# Patient Record
Sex: MALE | Race: WHITE | NOT HISPANIC OR LATINO | Employment: OTHER | URBAN - METROPOLITAN AREA
[De-identification: names, ages, dates, MRNs, and addresses within clinical notes are randomized per-mention and may not be internally consistent; named-entity substitution may affect disease eponyms.]

---

## 2017-01-18 ENCOUNTER — OFFICE VISIT (OUTPATIENT)
Dept: GASTROENTEROLOGY | Facility: CLINIC | Age: 57
End: 2017-01-18

## 2017-01-18 VITALS — WEIGHT: 212.2 LBS | BODY MASS INDEX: 26.38 KG/M2 | HEIGHT: 75 IN

## 2017-01-18 DIAGNOSIS — R11.0 NAUSEA: Primary | ICD-10-CM

## 2017-01-18 DIAGNOSIS — R10.32 LEFT LOWER QUADRANT PAIN: ICD-10-CM

## 2017-01-18 DIAGNOSIS — R19.7 DIARRHEA, UNSPECIFIED TYPE: ICD-10-CM

## 2017-01-18 DIAGNOSIS — R19.4 ALTERED BOWEL HABITS: ICD-10-CM

## 2017-01-18 PROCEDURE — 99214 OFFICE O/P EST MOD 30 MIN: CPT | Performed by: INTERNAL MEDICINE

## 2017-01-18 RX ORDER — SUCRALFATE 1 G/1
1 TABLET ORAL 4 TIMES DAILY PRN
Qty: 60 TABLET | Refills: 2 | Status: SHIPPED | OUTPATIENT
Start: 2017-01-18 | End: 2017-01-18 | Stop reason: SDUPTHER

## 2017-01-18 RX ORDER — PANTOPRAZOLE SODIUM 20 MG/1
TABLET, DELAYED RELEASE ORAL
Refills: 1 | COMMUNITY
Start: 2016-10-28 | End: 2017-01-18

## 2017-01-18 RX ORDER — SUCRALFATE 1 G/1
1 TABLET ORAL 4 TIMES DAILY PRN
Qty: 60 TABLET | Refills: 2 | Status: SHIPPED | OUTPATIENT
Start: 2017-01-18 | End: 2019-07-09

## 2017-01-18 NOTE — PROGRESS NOTES
Chief Complaint   Patient presents with   • Follow-up     c/s       Carson Rosales is a  56 y.o. male here for a follow up visit for Chronic nausea and abdominal discomfort as well as altered bowel habits.  Recently met Mr. Rosales drain encounter for an outpatient EGD and colonoscopy.  He described 25 years of intermittent nausea.  Nothing has really made this better or worse.  He never has acid reflux.  He does not take NSAIDs.  He does not vomit.  This often leads some filling worn out and fatigue.  He also has a lot of irregularity to his bowel movements.  He has abdominal discomfort intermittently.  He has taken something for this in the past but was told that it was habit-forming and he discontinued it although it did help.  Nothing else has really helped him.  He does have a personal history of colon polyps.  He was found to have a peptic duodenitis during his recent EGD and I started him on pantoprazole twice daily at a low dose.  He states this has made his diarrhea much worse.  He is having a lot of bowel noise and cramping.  His stools are consistently loose.  He does not feel any better on this medication.  He has not seen any blood in his stool.  His weight has been stable.  He had 2 small tubular adenomas removed from his descending colon during his most recent colonoscopy..   HPI  Past Medical History   Diagnosis Date   • Colon polyps      hx     Past Surgical History   Procedure Laterality Date   • Mandible fracture surgery         Current Outpatient Prescriptions:   •  hyoscyamine (LEVSIN) 0.125 MG SL tablet, Take 1 tablet by mouth Every 6 (Six) Hours As Needed for cramping., Disp: 60 tablet, Rfl: 3  •  sucralfate (CARAFATE) 1 G tablet, Take 1 tablet by mouth 4 (Four) Times a Day As Needed (nausea)., Disp: 60 tablet, Rfl: 2  PRN Meds:.  No Known Allergies  Social History     Social History   • Marital status:      Spouse name: N/A   • Number of children: N/A   • Years of education: N/A      Occupational History   • Not on file.     Social History Main Topics   • Smoking status: Current Every Day Smoker   • Smokeless tobacco: Not on file   • Alcohol use Yes      Comment: rarely   • Drug use: Not on file   • Sexual activity: Not on file     Other Topics Concern   • Not on file     Social History Narrative   • No narrative on file     History reviewed. No pertinent family history.  Review of Systems   Constitutional: Positive for appetite change and fatigue. Negative for fever and unexpected weight change.   Gastrointestinal: Positive for nausea. Negative for vomiting.     There were no vitals filed for this visit.  Last Weight    01/18/17  1457   Weight: 212 lb 3.2 oz (96.3 kg)     Physical Exam   Constitutional: He is oriented to person, place, and time. He appears well-developed and well-nourished.   HENT:   Head: Normocephalic and atraumatic.   Eyes: Conjunctivae are normal. No scleral icterus.   Neck: Normal range of motion. Neck supple.   Cardiovascular: Normal rate and regular rhythm.    Pulmonary/Chest: Effort normal and breath sounds normal.   Abdominal: Soft. Bowel sounds are normal. He exhibits no distension. There is tenderness.       Musculoskeletal: He exhibits no edema.   Neurological: He is alert and oriented to person, place, and time.   Skin: Skin is warm and dry.   Psychiatric: He has a normal mood and affect.   Nursing note and vitals reviewed.    No images are attached to the encounter.  Diagnoses and all orders for this visit:    Nausea  -     CBC & Differential  -     Comprehensive Metabolic Panel  -     Celiac Comprehensive Panel  -     TSH  -     Lipase  -     Amylase  -     US Gallbladder; Future    Altered bowel habits    Left lower quadrant pain    Diarrhea, unspecified type    Other orders  -     Discontinue: pantoprazole (PROTONIX) 20 MG EC tablet; TK 1 T PO BID  -     Discontinue: hyoscyamine (LEVSIN) 0.125 MG SL tablet; Take 1 tablet by mouth Every 6 (Six) Hours As  Needed for cramping.  -     Discontinue: sucralfate (CARAFATE) 1 G tablet; Take 1 tablet by mouth 4 (Four) Times a Day As Needed (nausea).  -     sucralfate (CARAFATE) 1 G tablet; Take 1 tablet by mouth 4 (Four) Times a Day As Needed (nausea).  -     hyoscyamine (LEVSIN) 0.125 MG SL tablet; Take 1 tablet by mouth Every 6 (Six) Hours As Needed for cramping.    Plan-  -Discontinue the pantoprazole as this has not improved his symptoms at all in fact is actually made him worse  -I'm going to start him on Carafate as needed as he may have some component of bile reflux.  -I given him a trial of an antispasmodic as well as he seemed to have some benefit from what sounded like an MS spasmodic in the past.  -Going to check a broad lab panel today  -I think the next episode gallbladder workup.  We will start with a gallbladder ultrasound and this is negative we'll proceed with a HIDA unless his labs suggest other etiology

## 2017-01-18 NOTE — MR AVS SNAPSHOT
Carson Rosales   1/18/2017 3:00 PM   Office Visit    Dept Phone:  [phone]   Encounter #:  [id number]    Provider:  Erica Vale MD   Department:  [facility] GASTRO[facility]OLOG[facility]                Your Full Care Plan              Today's Medication Changes          These changes are accurate as of: 1/18/17  3:53 PM.  If you have any questions, ask your nurse or doctor.               New Medication(s)Ordered:     hyoscyamine 0.125 MG SL tablet   Commonly known as:  LEVSIN   Take 1 tablet by mouth Every 6 (Six) Hours As Needed for cramping.   Started by:  Erica Vale MD       sucralfate 1 G tablet   Commonly known as:  CARAFATE   Take 1 tablet by mouth 4 (Four) Times a Day As Needed (nausea).   Started by:  Erica Vale MD         Stop taking medication(s)listed here:     pantoprazole 20 MG EC tablet   Commonly known as:  PROTONIX   Stopped by:  Erica Vale MD                Where to Get Your Medications      These medications were sent to [facility] [address] [place] [address] [address] AT [facility] - [phone] [facility] [phone] [place] [phone], [facility] [address]     Phone:  [phone]     hyoscyamine 0.125 MG SL tablet    sucralfate 1 G tablet                  Your Updated Medication List          This list is accurate as of: 1/18/17  3:53 PM.  Always use your most recent med list.                hyoscyamine 0.125 MG SL tablet   Commonly known as:  LEVSIN   Take 1 tablet by mouth Every 6 (Six) Hours As Needed for cramping.       sucralfate 1 G tablet   Commonly known as:  CARAFATE   Take 1 tablet by mouth 4 (Four) Times a Day As Needed (nausea).               We Performed the Following     Amylase     CBC & Differential     Celiac Comprehensive Panel     Comprehensive Metabolic Panel     Lipase     TSH       You Were Diagnosed With        Codes Comments    Nausea    -  Primary  "ICD-10-CM: R11.0  ICD-9-CM: 787.02       Instructions     None    Patient Instructions History      Upcoming Appointments     Visit Type Date Time Department    FOLLOW UP 2017  3:00 PM [facility] [place]      [employer] Signup     [facility] [employer] allows you to send messages to your doctor, view your test results, renew your prescriptions, schedule appointments, and more. To sign up, go to [employer]bill and click on the Sign Up Now link in the New User? box. Enter your [employer] Activation Code exactly as it appears below along with the last four digits of your Social Security Number and your Date of Birth () to complete the sign-up process. If you do not sign up before the expiration date, you must request a new code.    [employer] Activation Code: [email or web address]  Expires: 2017  3:53 PM    If you have questions, you can email [email or web address] or call [phone] to talk to our [employer] staff. Remember, [employer] is NOT to be used for urgent needs. For medical emergencies, dial 911.               Other Info from Your Visit           Allergies     No Known Allergies      Reason for Visit     Follow-up c/s      Vital Signs     Height Weight Body Mass Index Smoking Status          75\" (190.5 cm) 212 lb 3.2 oz (96.3 kg) 26.52 kg/m2 Current Every Day Smoker        Problems and Diagnoses Noted     Nauseous    -  Primary        "

## 2017-01-19 LAB
ALBUMIN SERPL-MCNC: 4.3 G/DL (ref 3.5–5.2)
ALBUMIN/GLOB SERPL: 1.7 G/DL
ALP SERPL-CCNC: 87 U/L (ref 39–117)
ALT SERPL-CCNC: 18 U/L (ref 1–41)
AMYLASE SERPL-CCNC: 88 U/L (ref 28–100)
AST SERPL-CCNC: 21 U/L (ref 1–40)
BASOPHILS # BLD AUTO: 0.03 10*3/MM3 (ref 0–0.2)
BASOPHILS NFR BLD AUTO: 0.6 % (ref 0–1.5)
BILIRUB SERPL-MCNC: 0.2 MG/DL (ref 0.1–1.2)
BUN SERPL-MCNC: 16 MG/DL (ref 6–20)
BUN/CREAT SERPL: 12.8 (ref 7–25)
CALCIUM SERPL-MCNC: 9.4 MG/DL (ref 8.6–10.5)
CHLORIDE SERPL-SCNC: 102 MMOL/L (ref 98–107)
CO2 SERPL-SCNC: 27.1 MMOL/L (ref 22–29)
CREAT SERPL-MCNC: 1.25 MG/DL (ref 0.76–1.27)
ENDOMYSIUM IGA SER QL: NEGATIVE
EOSINOPHIL # BLD AUTO: 0.23 10*3/MM3 (ref 0–0.7)
EOSINOPHIL NFR BLD AUTO: 4.5 % (ref 0.3–6.2)
ERYTHROCYTE [DISTWIDTH] IN BLOOD BY AUTOMATED COUNT: 12.5 % (ref 11.5–14.5)
GLIADIN PEPTIDE IGA SER-ACNC: 5 UNITS (ref 0–19)
GLIADIN PEPTIDE IGG SER-ACNC: 5 UNITS (ref 0–19)
GLOBULIN SER CALC-MCNC: 2.6 GM/DL
GLUCOSE SERPL-MCNC: 91 MG/DL (ref 65–99)
HCT VFR BLD AUTO: 44.5 % (ref 40.4–52.2)
HGB BLD-MCNC: 14.8 G/DL (ref 13.7–17.6)
IGA SERPL-MCNC: 236 MG/DL (ref 90–386)
IMM GRANULOCYTES # BLD: 0 10*3/MM3 (ref 0–0.03)
IMM GRANULOCYTES NFR BLD: 0 % (ref 0–0.5)
LIPASE SERPL-CCNC: 59 U/L (ref 13–60)
LYMPHOCYTES # BLD AUTO: 1.7 10*3/MM3 (ref 0.9–4.8)
LYMPHOCYTES NFR BLD AUTO: 33.2 % (ref 19.6–45.3)
MCH RBC QN AUTO: 32.5 PG (ref 27–32.7)
MCHC RBC AUTO-ENTMCNC: 33.3 G/DL (ref 32.6–36.4)
MCV RBC AUTO: 97.6 FL (ref 79.8–96.2)
MONOCYTES # BLD AUTO: 0.33 10*3/MM3 (ref 0.2–1.2)
MONOCYTES NFR BLD AUTO: 6.4 % (ref 5–12)
NEUTROPHILS # BLD AUTO: 2.83 10*3/MM3 (ref 1.9–8.1)
NEUTROPHILS NFR BLD AUTO: 55.3 % (ref 42.7–76)
PLATELET # BLD AUTO: 248 10*3/MM3 (ref 140–500)
POTASSIUM SERPL-SCNC: 4.3 MMOL/L (ref 3.5–5.2)
PROT SERPL-MCNC: 6.9 G/DL (ref 6–8.5)
RBC # BLD AUTO: 4.56 10*6/MM3 (ref 4.6–6)
SODIUM SERPL-SCNC: 144 MMOL/L (ref 136–145)
TSH SERPL DL<=0.005 MIU/L-ACNC: 2.41 MIU/ML (ref 0.27–4.2)
TTG IGA SER-ACNC: <2 U/ML (ref 0–3)
TTG IGG SER-ACNC: 2 U/ML (ref 0–5)
WBC # BLD AUTO: 5.12 10*3/MM3 (ref 4.5–10.7)

## 2017-01-19 NOTE — TELEPHONE ENCOUNTER
Hyosyamine ordered by Dr Vale on 1/18/17, #60, R3.  Escribe request for 90 day supply - #360, R3 received.  Message to Dr Vale to with this request.

## 2017-01-25 ENCOUNTER — TELEPHONE (OUTPATIENT)
Dept: GASTROENTEROLOGY | Facility: CLINIC | Age: 57
End: 2017-01-25

## 2017-01-25 NOTE — TELEPHONE ENCOUNTER
----- Message from Erica Vale MD sent at 1/20/2017  3:09 PM EST -----  Your recent labwork was normal.

## 2017-01-27 ENCOUNTER — HOSPITAL ENCOUNTER (OUTPATIENT)
Dept: ULTRASOUND IMAGING | Facility: HOSPITAL | Age: 57
Discharge: HOME | End: 2017-01-27
Attending: INTERNAL MEDICINE | Admitting: INTERNAL MEDICINE

## 2017-01-27 DIAGNOSIS — R11.0 NAUSEA: ICD-10-CM

## 2017-01-27 PROCEDURE — 76705 ECHO EXAM OF ABDOMEN: CPT

## 2017-02-06 ENCOUNTER — TELEPHONE (OUTPATIENT)
Dept: GASTROENTEROLOGY | Facility: CLINIC | Age: 57
End: 2017-02-06

## 2017-02-06 DIAGNOSIS — R11.0 NAUSEA: Primary | ICD-10-CM

## 2017-02-13 NOTE — TELEPHONE ENCOUNTER
Call to pt.  Advise per Dr Vale that gb us normal.  Would proceed with hida scan.  Pt verb understanding and states just back from vacation.  Will think about this, research insurance coverage and notify this office in a few days of his decision.

## 2019-03-13 ENCOUNTER — TELEPHONE (OUTPATIENT)
Dept: GASTROENTEROLOGY | Facility: CLINIC | Age: 59
End: 2019-03-13

## 2019-03-13 NOTE — TELEPHONE ENCOUNTER
----- Message from Ester Fink sent at 3/13/2019  4:01 PM EDT -----  Regarding: RECORDS  PT PCP REQUESTING COPY OF PT C/S & PATH REPORT. FAX#[phone].

## 2019-07-09 ENCOUNTER — HOSPITAL ENCOUNTER (OUTPATIENT)
Facility: HOSPITAL | Age: 59
Setting detail: HOSPITAL OUTPATIENT SURGERY
End: 2019-07-09
Attending: INTERNAL MEDICINE | Admitting: INTERNAL MEDICINE

## 2019-07-09 ENCOUNTER — OFFICE VISIT (OUTPATIENT)
Dept: GASTROENTEROLOGY | Facility: CLINIC | Age: 59
End: 2019-07-09

## 2019-07-09 VITALS
DIASTOLIC BLOOD PRESSURE: 80 MMHG | WEIGHT: 212.8 LBS | HEIGHT: 75 IN | BODY MASS INDEX: 26.46 KG/M2 | TEMPERATURE: 97.8 F | SYSTOLIC BLOOD PRESSURE: 122 MMHG

## 2019-07-09 DIAGNOSIS — D12.6 ADENOMATOUS POLYP OF COLON, UNSPECIFIED PART OF COLON: ICD-10-CM

## 2019-07-09 DIAGNOSIS — K58.2 IRRITABLE BOWEL SYNDROME WITH BOTH CONSTIPATION AND DIARRHEA: ICD-10-CM

## 2019-07-09 DIAGNOSIS — R11.0 CHRONIC NAUSEA: ICD-10-CM

## 2019-07-09 DIAGNOSIS — K21.9 GASTROESOPHAGEAL REFLUX DISEASE, ESOPHAGITIS PRESENCE NOT SPECIFIED: Primary | ICD-10-CM

## 2019-07-09 PROCEDURE — 99214 OFFICE O/P EST MOD 30 MIN: CPT | Performed by: NURSE PRACTITIONER

## 2019-07-09 RX ORDER — RANITIDINE 150 MG/1
150 TABLET ORAL 2 TIMES DAILY
Qty: 60 TABLET | Refills: 11 | Status: SHIPPED | OUTPATIENT
Start: 2019-07-09 | End: 2019-10-25 | Stop reason: ALTCHOICE

## 2019-07-09 NOTE — PROGRESS NOTES
Chief Complaint   Patient presents with   • Nausea   • Abdominal Pain   • Difficulty Swallowing       Carson Rosales is a  58 y.o. male here for a follow up visit for GERD.    HPI  57 yo m presents today for follow up visit for GERD. He is a patient of Dr. Vale. He was last seen in the office on 1/18/17. He has hx GERD/duodenitis and admits he has not been taking any medication routinely for his reflux symptoms. He had tried Protonix 40 mg BID in the past and it caused his IBS to be worse. He admits Carafate didn't help much. He does admit zantac taken every once in a while seems to help. He admits for the past several months he has been having worsening reflux with dysphagia. He admits a few weeks ago he had some [employer] chicken biscuit and it got stuck. He ended up vomiting it all up. He did have Gallbladder US in the past and it was normal. He never did get the HIDA scan done. He had his last EGD and colonoscopy in 10/2016. He does have hx adenomatous colon polyps. He admits he has had chronic nausea for the past 25 years. He also has hx IBS-mixed and admits he has tried Levsin and bentyl without relief. His PCP wanted him to try Viberzi but he read the side effects and didn't want to take it. He does not take any fiber. He does take a daily probiotic. He denies any rectal bleeding or melena. He admits his appetite is good and his weight is stable.         Past Medical History:   Diagnosis Date   • Colon polyps     hx       Past Surgical History:   Procedure Laterality Date   • COLONOSCOPY  approx 2016    benign polyps per pt    • MANDIBLE FRACTURE SURGERY     • UPPER GASTROINTESTINAL ENDOSCOPY  approx 2016    normal per pt        Scheduled Meds:    Continuous Infusions:  No current facility-administered medications for this visit.     PRN Meds:.    No Known Allergies    Social History     Socioeconomic History   • Marital status:      Spouse name: Not on file   • Number of children: Not on file   •  Years of education: Not on file   • Highest education level: Not on file   Tobacco Use   • Smoking status: Current Every Day Smoker   • Smokeless tobacco: Never Used   Substance and Sexual Activity   • Alcohol use: Yes     Comment: rarely   • Drug use: No       History reviewed. No pertinent family history.    Review of Systems   Constitutional: Negative for appetite change, chills, diaphoresis, fatigue, fever and unexpected weight change.   HENT: Positive for trouble swallowing. Negative for nosebleeds, postnasal drip, sore throat and voice change.    Respiratory: Negative for cough, choking, chest tightness, shortness of breath and wheezing.    Cardiovascular: Negative for chest pain, palpitations and leg swelling.   Gastrointestinal: Positive for abdominal pain, constipation, diarrhea and nausea. Negative for abdominal distention, anal bleeding, blood in stool, rectal pain and vomiting.   Endocrine: Negative for polydipsia, polyphagia and polyuria.   Musculoskeletal: Negative for gait problem.   Skin: Negative for rash and wound.   Allergic/Immunologic: Negative for food allergies.   Neurological: Negative for dizziness, speech difficulty and light-headedness.   Psychiatric/Behavioral: Negative for confusion, self-injury, sleep disturbance and suicidal ideas.       Vitals:    07/09/19 1117   BP: 122/80   Temp: 97.8 °F (36.6 °C)       Physical Exam   Constitutional: He is oriented to person, place, and time. He appears well-developed and well-nourished. He does not appear ill. No distress.   HENT:   Head: Normocephalic.   Eyes: Pupils are equal, round, and reactive to light.   Cardiovascular: Normal rate, regular rhythm and normal heart sounds.   Pulmonary/Chest: Effort normal and breath sounds normal.   Abdominal: Soft. Bowel sounds are normal. He exhibits no distension and no mass. There is no hepatosplenomegaly. There is no tenderness. There is no rebound and no guarding. No hernia.   Musculoskeletal: Normal  range of motion.   Neurological: He is alert and oriented to person, place, and time.   Skin: Skin is warm and dry.   Psychiatric: He has a normal mood and affect. His speech is normal and behavior is normal. Judgment normal.       No images are attached to the encounter.    Assessment & Plan      1. Gastroesophageal reflux disease, esophagitis presence not specified  - Case Request; Standing    2. Irritable bowel syndrome with both constipation and diarrhea  - Case Request; Standing    3. Chronic nausea  - Case Request; Standing    4. Adenomatous polyp of colon, unspecified part of colon  - Case Request; Standing    GERD is not well controlled. Will start him on some Zantac 150 mg BID. Given hx and current symptoms recommend EGD and colonoscopy with Dr. Vale for further evaluation. He is in agreement with the scopes. Continue GERD precautions. Continue probiotic. IBS-mixed is not well controlled. Recommend daily fiber supplement to start. Call office with update in 1 week. Call office with any issues.

## 2019-10-25 ENCOUNTER — OUTSIDE FACILITY SERVICE (OUTPATIENT)
Dept: GASTROENTEROLOGY | Facility: CLINIC | Age: 59
End: 2019-10-25

## 2019-10-25 PROCEDURE — 45380 COLONOSCOPY AND BIOPSY: CPT | Performed by: INTERNAL MEDICINE

## 2019-10-25 PROCEDURE — 43239 EGD BIOPSY SINGLE/MULTIPLE: CPT | Performed by: INTERNAL MEDICINE

## 2019-10-25 RX ORDER — PANTOPRAZOLE SODIUM 20 MG/1
20 TABLET, DELAYED RELEASE ORAL DAILY
Qty: 60 TABLET | Refills: 0 | Status: SHIPPED | OUTPATIENT
Start: 2019-10-25 | End: 2020-01-08

## 2019-11-07 ENCOUNTER — TELEPHONE (OUTPATIENT)
Dept: GASTROENTEROLOGY | Facility: CLINIC | Age: 59
End: 2019-11-07

## 2019-11-07 NOTE — TELEPHONE ENCOUNTER
Gastric and small bowel bx show inflammation - continue pantoprazole bid until f/u    No colon inflammation - repeat c/s 5 years    If he is still having nausea, will add carafate.    Would like to see him in office in 6 weeks - I could see him mon 12/16 at 1145 if he is available - pls have anh ob if agreeable and let me know if he wishes to try carfaate in interim

## 2019-11-08 NOTE — TELEPHONE ENCOUNTER
VM to pt - identifies as Reuben Rosales.  Advise per Dr Vale that if seeing a benefit - ok to stay on once/day only until f/u.  Ok to hold on carafate.  Contact office if any questions.

## 2019-11-08 NOTE — TELEPHONE ENCOUNTER
Call to pt . Advise per Dr Vale that gastric and small bowel bx show inflammation - continue pantoprazole bid until f/u.     No colon inflammation - repeat c/s in 5 yrs.     If still having nausea, will add carafate.      Verb understanding.  Accepts appt 12/16 @ 11:45.  States has been taking pantoprazole 20 mg 1 tab po daily as prescribed on 10/25.  Feels better than he has in years.  Concerned about taking too much med - asking if appropriate to stay at once/day or if does need to increase to twice/day.  Requests to not add carafate at this time.    Update/question to Dr Vale.    Message to Akosua KNOTT re: accepting appt.     C/s for 10/25/24 placed in recall.

## 2019-12-09 ENCOUNTER — TELEPHONE (OUTPATIENT)
Dept: GASTROENTEROLOGY | Facility: CLINIC | Age: 59
End: 2019-12-09

## 2019-12-09 NOTE — TELEPHONE ENCOUNTER
----- Message from Torrie Cespedes sent at 12/9/2019 10:52 AM EST -----  Regarding: [employer]  Contact: [phone]  QUESTION ABOUT REPORT

## 2019-12-09 NOTE — TELEPHONE ENCOUNTER
Called pt and pt reports he has a supplement insurance and he needs a copy of his c/s, egd, and path report so he can get reimbursed.  Advised pt we will mail this to his home address. Pt verb understanding.  Requested records sent.

## 2019-12-16 ENCOUNTER — OFFICE VISIT (OUTPATIENT)
Dept: GASTROENTEROLOGY | Facility: CLINIC | Age: 59
End: 2019-12-16

## 2019-12-16 VITALS — HEIGHT: 75 IN | WEIGHT: 208 LBS | TEMPERATURE: 98.3 F | BODY MASS INDEX: 25.86 KG/M2

## 2019-12-16 DIAGNOSIS — R11.0 CHRONIC NAUSEA: ICD-10-CM

## 2019-12-16 DIAGNOSIS — D12.6 ADENOMATOUS POLYP OF COLON, UNSPECIFIED PART OF COLON: ICD-10-CM

## 2019-12-16 DIAGNOSIS — K21.9 GASTROESOPHAGEAL REFLUX DISEASE WITHOUT ESOPHAGITIS: Primary | ICD-10-CM

## 2019-12-16 DIAGNOSIS — K58.2 IRRITABLE BOWEL SYNDROME WITH BOTH CONSTIPATION AND DIARRHEA: ICD-10-CM

## 2019-12-16 PROCEDURE — 99213 OFFICE O/P EST LOW 20 MIN: CPT | Performed by: INTERNAL MEDICINE

## 2019-12-16 NOTE — PROGRESS NOTES
Subjective   Chief Complaint   Patient presents with   • Difficulty Swallowing   • Diarrhea   • Hemorrhoids       Carson Rosales is a  59 y.o. male here for a follow up visit for nausea, diarrhea and hemorrhoids.     Patient underwent a recent EGD and colonoscopy.  He had a peptic duodenal apathy noted on his EGD which is similar to his previous evaluation 2016.  Patient reports he has had fairly chronic nausea and indigestion for 20 years.  He is tried multiple medications over the years for a sour stomach as well as this diarrhea without sustained results.  Since his EGD he has been taking Protonix 20 mg daily as well as a fiber supplement daily.  His nausea has resolved and his bowel movements are much more consistent.  He denies any abdominal pain.  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    He has not had any diarrhea until yesterday when he had multiple bouts of diarrhea.  He is not sure if he ate something unusual if it is just related to stress.  As a result his hemorrhoids are very irritated at this point.  He is managing these with over-the-counter remedies.    Has a history of adenomatous polyps but no polyps were found on his most recent exam.  5-year repeat was recommended.  He has not seen any blood in his stool.  HPI  Past Medical History:   Diagnosis Date   • Colon polyps     hx     Past Surgical History:   Procedure Laterality Date   • COLONOSCOPY  approx 2016    benign polyps per pt    • COLONOSCOPY  10/25/2019    tics, NBIH, inflammation, melanosis coli   • MANDIBLE FRACTURE SURGERY     • UPPER GASTROINTESTINAL ENDOSCOPY  approx 2016    normal per pt    • UPPER GASTROINTESTINAL ENDOSCOPY  10/25/2019    gastritis, duodenitis       Current Outpatient Medications:   •  FIBER PO, Take  by mouth., Disp: , Rfl:   •  pantoprazole (PROTONIX) 20 MG EC tablet, Take 1 tablet by mouth Daily for 60 days., Disp: 60 tablet,  Rfl: 0  •  Probiotic Product (PROBIOTIC DAILY PO), Take  by mouth Daily., Disp: , Rfl:   PRN Meds:.  No Known Allergies  Social History     Socioeconomic History   • Marital status:      Spouse name: Not on file   • Number of children: Not on file   • Years of education: Not on file   • Highest education level: Not on file   Tobacco Use   • Smoking status: Current Every Day Smoker     Packs/day: 0.25     Types: Cigarettes     Start date: 1976   • Smokeless tobacco: Never Used   Substance and Sexual Activity   • Alcohol use: Yes     Comment: rarely   • Drug use: No     No family history on file.  Review of Systems   Constitutional: Negative for appetite change and unexpected weight change.   HENT: Negative for trouble swallowing.    Gastrointestinal: Positive for diarrhea and rectal pain. Negative for abdominal pain, anal bleeding, blood in stool and nausea.   Psychiatric/Behavioral: Positive for sleep disturbance.   All other systems reviewed and are negative.    Vitals:    12/16/19 1206   Temp: 98.3 °F (36.8 °C)         12/16/19  1206   Weight: 94.3 kg (208 lb)       Objective   Physical Exam   Constitutional: He appears well-developed and well-nourished.   HENT:   Head: Normocephalic and atraumatic.   Eyes: No scleral icterus.   Pulmonary/Chest: Effort normal.   Abdominal: Soft. He exhibits no distension.   Neurological: He is alert.   Skin: Skin is warm and dry.   Psychiatric: He has a normal mood and affect.     No images are attached to the encounter.    Assessment/Plan   Diagnoses and all orders for this visit:    Gastroesophageal reflux disease without esophagitis    Irritable bowel syndrome with both constipation and diarrhea    Chronic nausea    Adenomatous polyp of colon, unspecified part of colon    Other orders  -     FIBER PO; Take  by mouth.      Plan:  · His symptoms are much improved with daily pantoprazole and fiber.  Would recommend continuation of his current regimen.  I expect that he will  get better still even when his stress level decreases.  · Continue over-the-counter remedies for hemorrhoids including soaks, steroid cream-I have asked him to contact me if these remedies do not provide relief and I can prescribe something stronger.  · Repeat colonoscopy in 5 years due to history of adenomatous polyps

## 2019-12-23 ENCOUNTER — TELEPHONE (OUTPATIENT)
Dept: GASTROENTEROLOGY | Facility: CLINIC | Age: 59
End: 2019-12-23

## 2019-12-23 NOTE — TELEPHONE ENCOUNTER
Call to pt.  Advise per M Fili that can have samples of Recticream, or can send rx for Analpram to apply topically twice daily until healed.  Can also try hydrocortisone cream supp otc as needed as well.  If hemorrhoids gets really bad, need to refer to Dr Viridiana Hunter for further eval.      Verb understanding.  States using OTC products.  Requesting analpram rx.   Padmini completed for Analpram 2.5% rectal cream, #30 Gm, R1.

## 2019-12-23 NOTE — TELEPHONE ENCOUNTER
Called pt and advised Dr Vale out of office but will send messge to Amarilys NP regarding something for his hemorrhoids.   Pt was just seen by Dr Vale on 12/16.  Pt verb understanding.

## 2019-12-23 NOTE — TELEPHONE ENCOUNTER
I would have him either come by and get samples of the RectiCare cream to use twice daily as needed or send in a prescription for the Analpram cream to apply topically to the affected areas twice daily until healed.  And he can also try some hydrocortisone cream suppositories OTC as needed as well.  If the hemorrhoids get really bad we need to refer him to Dr. Isabelle Hunter for further evaluation.  Thanks

## 2019-12-23 NOTE — TELEPHONE ENCOUNTER
----- Message from Marlen Webster sent at 12/23/2019 10:33 AM EST -----  Regarding: Medication request  Contact: [phone]  Pt is calling because he talk with Dr Vale of a medication- antibiotic cream for hemorrhoids. He is asking to be call in     [id number] [address] [facility] [address] [facility] - [phone] [facility] [phone] FX Phone:  [phone] Fax:  [phone]

## 2019-12-24 ENCOUNTER — PRIOR AUTHORIZATION (OUTPATIENT)
Dept: GASTROENTEROLOGY | Facility: CLINIC | Age: 59
End: 2019-12-24

## 2019-12-24 NOTE — TELEPHONE ENCOUNTER
I think he did get just as much relief from the RectiCare cream samples that we have in the coupons that we have.  It has lidocaine in it.  He can use the cream twice daily as needed until healed.  See if he can come by and pick some up or he can pick some up I think at [facility] or [facility].  Thanks

## 2019-12-24 NOTE — TELEPHONE ENCOUNTER
Spoke with Rodríguez to submit PA request for hydrocortisone-pramoxine (ANALPRAM HC) 2.5-1 % rectal cream.  PA auto denied as non covered for patient.

## 2019-12-24 NOTE — TELEPHONE ENCOUNTER
Called pt and on identified vm advised pt that his insurance did not approve the analpram script.  Amarilys Np recommends that he can use recticare cream bid as needed until healed.  Advised to office closing and holiday he can get this otc at [facility], [facility] and or [facility].  Advised pt to call with any questions.

## 2020-01-08 RX ORDER — PANTOPRAZOLE SODIUM 20 MG/1
TABLET, DELAYED RELEASE ORAL
Qty: 60 TABLET | Refills: 2 | Status: SHIPPED | OUTPATIENT
Start: 2020-01-08

## 2021-08-10 ENCOUNTER — TELEPHONE (OUTPATIENT)
Dept: GASTROENTEROLOGY | Facility: CLINIC | Age: 61
End: 2021-08-10

## 2021-08-10 NOTE — TELEPHONE ENCOUNTER
C/s for 10/25/24 is in recall (see encounter note of 11/7/19).     VM to pt with request to contact office.

## 2021-08-10 NOTE — TELEPHONE ENCOUNTER
----- Message from Yoko Rivera sent at 8/10/2021  1:31 PM EDT -----  Regarding: schedule  Contact: [phone]  Pt left vm needs to schedule colonoscopy

## 2021-08-11 NOTE — TELEPHONE ENCOUNTER
Called pt and pt is asking if he is due to schedule for c/s.  ADvised pt that per Dr Vale he is due for his next c/s in Oct of 2024.  Pt verb understanding.

## 2022-08-08 ENCOUNTER — APPOINTMENT (OUTPATIENT)
Dept: GENERAL RADIOLOGY | Facility: HOSPITAL | Age: 62
End: 2022-08-08

## 2022-08-08 ENCOUNTER — HOSPITAL ENCOUNTER (EMERGENCY)
Facility: HOSPITAL | Age: 62
Discharge: HOME | End: 2022-08-08
Attending: EMERGENCY MEDICINE | Admitting: EMERGENCY MEDICINE

## 2022-08-08 ENCOUNTER — APPOINTMENT (OUTPATIENT)
Dept: CT IMAGING | Facility: HOSPITAL | Age: 62
End: 2022-08-08

## 2022-08-08 VITALS
RESPIRATION RATE: 18 BRPM | HEART RATE: 49 BPM | TEMPERATURE: 98.6 F | DIASTOLIC BLOOD PRESSURE: 83 MMHG | OXYGEN SATURATION: 100 % | SYSTOLIC BLOOD PRESSURE: 146 MMHG

## 2022-08-08 DIAGNOSIS — R07.9 ACUTE NONSPECIFIC CHEST PAIN WITH LOW RISK OF CORONARY ARTERY DISEASE: Primary | ICD-10-CM

## 2022-08-08 DIAGNOSIS — Z91.89 ACUTE NONSPECIFIC CHEST PAIN WITH LOW RISK OF CORONARY ARTERY DISEASE: Primary | ICD-10-CM

## 2022-08-08 LAB
ALBUMIN SERPL-MCNC: 4.6 G/DL (ref 3.5–5.2)
ALBUMIN/GLOB SERPL: 1.6 G/DL
ALP SERPL-CCNC: 107 U/L (ref 39–117)
ALT SERPL W P-5'-P-CCNC: 24 U/L (ref 1–41)
ANION GAP SERPL CALCULATED.3IONS-SCNC: 11.7 MMOL/L (ref 5–15)
AST SERPL-CCNC: 30 U/L (ref 1–40)
BASOPHILS # BLD AUTO: 0.03 10*3/MM3 (ref 0–0.2)
BASOPHILS NFR BLD AUTO: 0.5 % (ref 0–1.5)
BILIRUB SERPL-MCNC: 0.4 MG/DL (ref 0–1.2)
BUN SERPL-MCNC: 9 MG/DL (ref 6–20)
BUN/CREAT SERPL: 8.1 (ref 7–25)
CALCIUM SPEC-SCNC: 9.2 MG/DL (ref 8.6–10.5)
CHLORIDE SERPL-SCNC: 101 MMOL/L (ref 98–107)
CO2 SERPL-SCNC: 27.3 MMOL/L (ref 22–29)
CREAT SERPL-MCNC: 1.11 MG/DL (ref 0.76–1.27)
DEPRECATED RDW RBC AUTO: 45.4 FL (ref 37–54)
EGFRCR SERPLBLD CKD-EPI 2021: 76.5 ML/MIN/1.73
EOSINOPHIL # BLD AUTO: 0.21 10*3/MM3 (ref 0–0.4)
EOSINOPHIL NFR BLD AUTO: 3.7 % (ref 0.3–6.2)
ERYTHROCYTE [DISTWIDTH] IN BLOOD BY AUTOMATED COUNT: 12.8 % (ref 12.3–15.4)
GLOBULIN UR ELPH-MCNC: 2.9 GM/DL
GLUCOSE SERPL-MCNC: 92 MG/DL (ref 65–99)
HCT VFR BLD AUTO: 46.7 % (ref 37.5–51)
HGB BLD-MCNC: 15.5 G/DL (ref 13–17.7)
HOLD SPECIMEN: NORMAL
HOLD SPECIMEN: NORMAL
IMM GRANULOCYTES # BLD AUTO: 0.01 10*3/MM3 (ref 0–0.05)
IMM GRANULOCYTES NFR BLD AUTO: 0.2 % (ref 0–0.5)
LYMPHOCYTES # BLD AUTO: 1.39 10*3/MM3 (ref 0.7–3.1)
LYMPHOCYTES NFR BLD AUTO: 24.7 % (ref 19.6–45.3)
MCH RBC QN AUTO: 31.6 PG (ref 26.6–33)
MCHC RBC AUTO-ENTMCNC: 33.2 G/DL (ref 31.5–35.7)
MCV RBC AUTO: 95.3 FL (ref 79–97)
MONOCYTES # BLD AUTO: 0.34 10*3/MM3 (ref 0.1–0.9)
MONOCYTES NFR BLD AUTO: 6 % (ref 5–12)
NEUTROPHILS NFR BLD AUTO: 3.64 10*3/MM3 (ref 1.7–7)
NEUTROPHILS NFR BLD AUTO: 64.9 % (ref 42.7–76)
NRBC BLD AUTO-RTO: 0 /100 WBC (ref 0–0.2)
PLATELET # BLD AUTO: 208 10*3/MM3 (ref 140–450)
PMV BLD AUTO: 9.5 FL (ref 6–12)
POTASSIUM SERPL-SCNC: 4.7 MMOL/L (ref 3.5–5.2)
PROT SERPL-MCNC: 7.5 G/DL (ref 6–8.5)
QT INTERVAL: 442 MS
RBC # BLD AUTO: 4.9 10*6/MM3 (ref 4.14–5.8)
SODIUM SERPL-SCNC: 140 MMOL/L (ref 136–145)
TROPONIN T SERPL-MCNC: <0.01 NG/ML (ref 0–0.03)
TROPONIN T SERPL-MCNC: <0.01 NG/ML (ref 0–0.03)
WBC NRBC COR # BLD: 5.62 10*3/MM3 (ref 3.4–10.8)
WHOLE BLOOD HOLD COAG: NORMAL
WHOLE BLOOD HOLD SPECIMEN: NORMAL

## 2022-08-08 PROCEDURE — 99283 EMERGENCY DEPT VISIT LOW MDM: CPT

## 2022-08-08 PROCEDURE — 84484 ASSAY OF TROPONIN QUANT: CPT | Performed by: EMERGENCY MEDICINE

## 2022-08-08 PROCEDURE — 71046 X-RAY EXAM CHEST 2 VIEWS: CPT

## 2022-08-08 PROCEDURE — 71275 CT ANGIOGRAPHY CHEST: CPT

## 2022-08-08 PROCEDURE — 80053 COMPREHEN METABOLIC PANEL: CPT

## 2022-08-08 PROCEDURE — 93010 ELECTROCARDIOGRAM REPORT: CPT | Performed by: INTERNAL MEDICINE

## 2022-08-08 PROCEDURE — 36415 COLL VENOUS BLD VENIPUNCTURE: CPT

## 2022-08-08 PROCEDURE — 85025 COMPLETE CBC W/AUTO DIFF WBC: CPT

## 2022-08-08 PROCEDURE — 93005 ELECTROCARDIOGRAM TRACING: CPT

## 2022-08-08 PROCEDURE — 0 IOPAMIDOL PER 1 ML: Performed by: EMERGENCY MEDICINE

## 2022-08-08 PROCEDURE — 93005 ELECTROCARDIOGRAM TRACING: CPT | Performed by: EMERGENCY MEDICINE

## 2022-08-08 PROCEDURE — 84484 ASSAY OF TROPONIN QUANT: CPT

## 2022-08-08 RX ORDER — ASPIRIN 325 MG
325 TABLET ORAL ONCE
Status: COMPLETED | OUTPATIENT
Start: 2022-08-08 | End: 2022-08-08

## 2022-08-08 RX ORDER — SODIUM CHLORIDE 0.9 % (FLUSH) 0.9 %
10 SYRINGE (ML) INJECTION AS NEEDED
Status: DISCONTINUED | OUTPATIENT
Start: 2022-08-08 | End: 2022-08-08 | Stop reason: HOSPADM

## 2022-08-08 RX ADMIN — IOPAMIDOL 100 ML: 755 INJECTION, SOLUTION INTRAVENOUS at 16:37

## 2022-08-08 RX ADMIN — ASPIRIN 325 MG: 325 TABLET ORAL at 15:09

## 2022-08-08 NOTE — ED NOTES
Patient states days ago he started having dizziness, nausea and chest pain. Denies any blurred or double vision.

## 2022-08-08 NOTE — ED PROVIDER NOTES
" EMERGENCY DEPARTMENT ENCOUNTER    Room Number:  31/31  Date of encounter:  8/8/2022  PCP: Emelina Flores APRN  Historian: Patient      HPI:  Chief Complaint: Chest tightness  A complete HPI/ROS/PMH/PSH/SH/FH are unobtainable due to: N/A    Context: Carson Rosales is a 61 y.o. male who presents to the ED c/o intermittent chest tightness for the past several days.  He describes a \"pulled muscle feeling\" in the center of his chest that does not radiate.  It is mild at its worst and he is currently pain-free.  He has associated diaphoresis and nausea as well as occasional shortness of breath.  No fevers or chills.  No cough or congestion.  No lower extremity edema.  He has been traveling recently throughout the [place] with his wife.  He does smoke, but he also walks on a treadmill for 30 minutes 3-4 times a week and reports no changes in his exercise tolerance.      Duration: Several  Onset: Gradual  Timing: Intermittent  Location: Central chest  Radiation: None  Quality: \"Like a pulled muscle\"  Intensity/Severity: Mild  Progression: Continued  Associated Symptoms: Diaphoresis, nausea, shortness of breath  Aggravating Factors: Nothing  Alleviating Factors: Nothing  Previous Episodes: No  Treatment before arrival: None    Summary of prior records: No recent ER visits/hospitalizations [facility].  Review of prior vital signs indicate that he is usually bradycardic.    The patient was placed in a mask in triage, hand hygiene was performed before and after my interaction with the patient.  I wore a mask, safety glasses and gloves during my entire interaction with the patient.    PAST MEDICAL HISTORY  Active Ambulatory Problems     Diagnosis Date Noted   • Gastroesophageal reflux disease 07/09/2019   • Irritable bowel syndrome with both constipation and diarrhea 07/09/2019   • Chronic nausea 07/09/2019   • Adenomatous polyp of colon 07/09/2019     Resolved Ambulatory Problems     " Diagnosis Date Noted   • No Resolved Ambulatory Problems     Past Medical History:   Diagnosis Date   • Colon polyps          PAST SURGICAL HISTORY  Past Surgical History:   Procedure Laterality Date   • COLONOSCOPY  approx 2016    benign polyps per pt    • COLONOSCOPY  10/25/2019    tics, NBIH, inflammation, melanosis coli   • MANDIBLE FRACTURE SURGERY     • UPPER GASTROINTESTINAL ENDOSCOPY  approx 2016    normal per pt    • UPPER GASTROINTESTINAL ENDOSCOPY  10/25/2019    gastritis, duodenitis         FAMILY HISTORY  No family history on file.      SOCIAL HISTORY  Social History     Socioeconomic History   • Marital status:    Tobacco Use   • Smoking status: Current Every Day Smoker     Packs/day: 0.25     Types: Cigarettes     Start date: 1976   • Smokeless tobacco: Never Used   Substance and Sexual Activity   • Alcohol use: Yes     Comment: rarely   • Drug use: No         ALLERGIES  Patient has no known allergies.        REVIEW OF SYSTEMS  Review of Systems   Constitutional: Positive for diaphoresis.   Respiratory: Positive for chest tightness.    Cardiovascular: Negative for leg swelling.   Gastrointestinal: Positive for nausea. Negative for abdominal distention, diarrhea and vomiting.   Neurological: Positive for dizziness.        All systems reviewed and negative except for those discussed in HPI.       PHYSICAL EXAM    I have reviewed the triage vital signs and nursing notes.    ED Triage Vitals [08/08/22 1245]   Temp Heart Rate Resp BP SpO2   96.4 °F (35.8 °C) 50 16 -- 100 %      Temp src Heart Rate Source Patient Position BP Location FiO2 (%)   -- -- -- -- --       Physical Exam   Constitutional: Pt. is oriented to person, place, and time and well-developed, well-nourished, and in no distress.   HENT: Normocephalic and atraumatic.   Neck: Normal range of motion. Neck supple. No JVD present.   Cardiovascular: Normal rate, regular rhythm and normal heart sounds. No murmur heard.  Pulmonary/Chest:  Effort normal and breath sounds normal. No stridor. No respiratory distress. No wheezes, no rales.   Abdominal: Soft. Bowel sounds are normal. No distension. There is no tenderness. There is no rebound and no guarding.   Musculoskeletal: Normal range of motion. No edema, tenderness or deformity.   Neurological: Pt. is alert and oriented to person, place, and time.  He has no focal neurologic deficits  Skin: Skin is warm and dry. No rash noted. Pt. is not diaphoretic. No erythema.   Psychiatric: Mood, affect and judgment normal.  He is pleasant and cooperative.  Nursing note and vitals reviewed.        LAB RESULTS  Recent Results (from the past 24 hour(s))   ECG 12 Lead    Collection Time: 08/08/22 12:54 PM   Result Value Ref Range    QT Interval 442 ms   Comprehensive Metabolic Panel    Collection Time: 08/08/22  1:47 PM    Specimen: Blood   Result Value Ref Range    Glucose 92 65 - 99 mg/dL    BUN 9 6 - 20 mg/dL    Creatinine 1.11 0.76 - 1.27 mg/dL    Sodium 140 136 - 145 mmol/L    Potassium 4.7 3.5 - 5.2 mmol/L    Chloride 101 98 - 107 mmol/L    CO2 27.3 22.0 - 29.0 mmol/L    Calcium 9.2 8.6 - 10.5 mg/dL    Total Protein 7.5 6.0 - 8.5 g/dL    Albumin 4.60 3.50 - 5.20 g/dL    ALT (SGPT) 24 1 - 41 U/L    AST (SGOT) 30 1 - 40 U/L    Alkaline Phosphatase 107 39 - 117 U/L    Total Bilirubin 0.4 0.0 - 1.2 mg/dL    Globulin 2.9 gm/dL    A/G Ratio 1.6 g/dL    BUN/Creatinine Ratio 8.1 7.0 - 25.0    Anion Gap 11.7 5.0 - 15.0 mmol/L    eGFR 76.5 >60.0 mL/min/1.73   Troponin    Collection Time: 08/08/22  1:47 PM    Specimen: Blood   Result Value Ref Range    Troponin T <0.010 0.000 - 0.030 ng/mL   Green Top (Gel)    Collection Time: 08/08/22  1:47 PM   Result Value Ref Range    Extra Tube Hold for add-ons.    Lavender Top    Collection Time: 08/08/22  1:47 PM   Result Value Ref Range    Extra Tube hold for add-on    Gold Top - SST    Collection Time: 08/08/22  1:47 PM   Result Value Ref Range    Extra Tube Hold for add-ons.     Light Blue Top    Collection Time: 08/08/22  1:47 PM   Result Value Ref Range    Extra Tube Hold for add-ons.    CBC Auto Differential    Collection Time: 08/08/22  1:47 PM    Specimen: Blood   Result Value Ref Range    WBC 5.62 3.40 - 10.80 10*3/mm3    RBC 4.90 4.14 - 5.80 10*6/mm3    Hemoglobin 15.5 13.0 - 17.7 g/dL    Hematocrit 46.7 37.5 - 51.0 %    MCV 95.3 79.0 - 97.0 fL    MCH 31.6 26.6 - 33.0 pg    MCHC 33.2 31.5 - 35.7 g/dL    RDW 12.8 12.3 - 15.4 %    RDW-SD 45.4 37.0 - 54.0 fl    MPV 9.5 6.0 - 12.0 fL    Platelets 208 140 - 450 10*3/mm3    Neutrophil % 64.9 42.7 - 76.0 %    Lymphocyte % 24.7 19.6 - 45.3 %    Monocyte % 6.0 5.0 - 12.0 %    Eosinophil % 3.7 0.3 - 6.2 %    Basophil % 0.5 0.0 - 1.5 %    Immature Grans % 0.2 0.0 - 0.5 %    Neutrophils, Absolute 3.64 1.70 - 7.00 10*3/mm3    Lymphocytes, Absolute 1.39 0.70 - 3.10 10*3/mm3    Monocytes, Absolute 0.34 0.10 - 0.90 10*3/mm3    Eosinophils, Absolute 0.21 0.00 - 0.40 10*3/mm3    Basophils, Absolute 0.03 0.00 - 0.20 10*3/mm3    Immature Grans, Absolute 0.01 0.00 - 0.05 10*3/mm3    nRBC 0.0 0.0 - 0.2 /100 WBC   Troponin    Collection Time: 08/08/22  5:31 PM    Specimen: Arm, Right; Blood   Result Value Ref Range    Troponin T <0.010 0.000 - 0.030 ng/mL       Ordered the above labs and independently reviewed the results.        RADIOLOGY  XR Chest 2 View    Result Date: 8/8/2022  XR CHEST 2 VW-  HISTORY: Male who is 61 years-old,  chest pain  TECHNIQUE: Frontal and lateral views of the chest  COMPARISON: None available  FINDINGS: Heart, mediastinum and pulmonary vasculature are unremarkable. No focal pulmonary consolidation, pleural effusion, or pneumothorax. Old granulomatous disease is seen. No acute osseous process.      No evidence for acute pulmonary process. Follow-up as clinical indications persist.  This report was finalized on 8/8/2022 1:26 PM by Dr. Zaheer Armstrong M.D.      CT Angiogram Chest    Result Date: 8/8/2022  EXAMINATION: CT  ANGIOGRAM OF THE CHEST WITH CONTRAST  HISTORY: 61-year-old male with a history of chest tightness for the past several days, rule out pulmonary embolism.  TECHNIQUE: Contiguous axial images were obtained through the chest following bolus intravenous administration of nonionic contrast. Three-D reformatted images were produced and presented for interpretation.  COMPARISON: Chest x-ray, 08/08/2022  FINDINGS: No filling defects are seen within the pulmonary arterial system to the level of the subsegmental pulmonary arteries. The heart and great vessels have a normal appearance. No evidence for mediastinal or hilar adenopathy is appreciated. The lungs are clear. The visualized soft tissue structures of the upper abdomen appear normal. Mild multilevel osteophytic change is seen throughout the thoracic spine.      There is no evidence for an active or acute abnormality of the chest. Specifically, there is no evidence for a pulmonary embolism.  Radiation dose reduction techniques were utilized, including automated exposure control and exposure modulation based on body size.         I ordered the above noted radiological studies. Reviewed by me and discussed with radiologist.  See dictation for official radiology interpretation.      PROCEDURES    Procedures      MEDICATIONS GIVEN IN ER    Medications   sodium chloride 0.9 % flush 10 mL (has no administration in time range)   aspirin tablet 325 mg (325 mg Oral Given 8/8/22 1509)   iopamidol (ISOVUE-370) 76 % injection 100 mL (100 mL Intravenous Given 8/8/22 1637)         PROGRESS, DATA ANALYSIS, CONSULTS, AND MEDICAL DECISION MAKING    Any/all labs have been independently reviewed by me.  Any/all radiology studies have been reviewed by me and discussed with radiologist dictating the report.   EKG's independently viewed and interpreted by me.  Discussion below represents my analysis of pertinent findings related to patient's condition, differential diagnosis, treatment plan  and final disposition.    Number of Diagnoses or Management Options     Amount and/or Complexity of Data Reviewed  Clinical lab tests:  Yes  Tests in the radiology section of CPT®:  Yes  Tests in the medicine section of CPT®:  No  Review and summarize past medical records:  (Yes-see HPI)  Independent visualization of images, tracings, or specimens: (Yes-see below)      ED Course as of 08/08/22 1818   Mon Aug 08, 2022   1454 Differential diagnosis includes but is not limited to: Pulmonary embolism, aortic dissection, acute coronary syndrome/STEMI, pneumonia/CHF/COPD exacerbation, pneumothorax, pleurisy, bronchitis, gastroesophageal reflux, referred pain, traumatic injury/rib fractures, etc. [WC]   1454 Cardiac monitor revealed normal sinus rhythm as interpreted by me.  Cardiac monitoring was ordered secondary to the patient's history of chest pain and to monitor the patient for dysrhythmia. [WC]   1454 EKG performed at 1254 and interpreted by me shows sinus bradycardia with a heart rate of 47 bpm.  AZ intervals, QS complexes and ST-T segments are unremarkable.  There are no prior EKGs available for comparison. [WC]   1454 CBC, CMP and initial troponin are all unremarkable. [WC]   1455 CT of the chest was independently viewed by me and discussed with/interpreted by Dr. Armstrong (radiology) -there are no acute processes identified.  For official interpretation, see dictated report. [WC]   1517 CT angiogram will be obtained to further evaluate for pulmonary embolism given his history of recent travel.  Initial troponin is negative-this will be repeated. [WC]   1731 CT of the chest was independently viewed by me and discussed with/interpreted by Dr. Bhagat (radiology) -there are no acute processes identified.  For official interpretation, see dictated report. [WC]   1746 Awaiting repeat troponin. [WC]   1814 Troponin T: <0.010  Negative.  All results were discussed with the patient.  Admission to the observation unit for  cardiology consultation was offered, however the patient has declined.  He states his primary care provider is arranging for him to be seen by cardiology.  I think this is acceptable-he was advised to return to emergency department for any concerns.    Labs, EKG and imaging as above.  My clinical suspicion for a serious underlying pulmonary or cardiac etiology is low.  There is no evidence to suggest pulmonary embolism, aortic dissection or acute coronary syndrome.  No evidence of pneumonia/CHF/COPD exacerbation that would require inpatient admission.  The patient was advised to return to the emergency department should the symptoms worsen or for any new concerns.  The patient can be safely followed as an outpatient for further workup as indicated.      HEART SCORE    History Slightly or non-suspicious (0)  ECG Normal (0)  Age 46-65 (1)  Risk factors 1 or 2 (1)  Troponin < or = Normal limit (0)    This patient's HEART score is 2    HEART Score Key:  Scores 0-3: 0.9-1.7% risk of adverse cardiac event. In the HEART Score study, these patients were discharged (0.99% in the retrospective study, 1.7% in the prospective study)  Scores 4-6: 12-16.6% risk of adverse cardiac event. In the HEART Score study, these patients were admitted to the hospital. (11.6% retrospective, 16.6% prospective)  Scores ?7: 50-65% risk of adverse cardiac event. In the HEART Score study, these patients were candidates for early invasive measures. (65.2% retrospective, 50.1% prospective)     [WC]      ED Course User Index  [WC] Denzel Tellez MD       AS OF 18:18 EDT VITALS:    BP - 145/86  HR - (!) 46  TEMP - 97.6 °F (36.4 °C) (Oral)  02 SATS - 100%        DIAGNOSIS  Final diagnoses:   Acute nonspecific chest pain with low risk of coronary artery disease         DISPOSITION  Discharged           Denzel Tellez MD  08/08/22 2449

## 2022-08-17 NOTE — PROGRESS NOTES
RM:________    Referral Provider: Denzel Tellez MD Richardson, Tricia, APRN    NEW PATIENT/ CONSULT  PREVIOUS CARDIOLOGIST:   CARDIAC TESTING:     : 1960   AGE: 61 y.o.    2022  REASON FOR VISIT/  CC:      WT: ____________ BP: __________L __________R/ HR_______________    CHEST PAIN: _____________    SOA: ____________PALPS: __________      LIGHTHEADED: ___________ FATIGUE: _______________ EDEMA______________  ALLERGIES:  Patient has no known allergies.  SMOKING HISTORY  Social History     Tobacco Use   • Smoking status: Current Every Day Smoker     Packs/day: 0.25     Types: Cigarettes     Start date:    • Smokeless tobacco: Never Used   Substance Use Topics   • Alcohol use: Yes     Comment: rarely   • Drug use: No          CHILDREN: _______________________       CAFFEINE USE________  ALCOHOL _____________  OCCUPATION_____________  Past Surgical History:   Procedure Laterality Date   • COLONOSCOPY  approx     benign polyps per pt    • COLONOSCOPY  10/25/2019    tics, NBIH, inflammation, melanosis coli   • MANDIBLE FRACTURE SURGERY     • UPPER GASTROINTESTINAL ENDOSCOPY  approx     normal per pt    • UPPER GASTROINTESTINAL ENDOSCOPY  10/25/2019    gastritis, duodenitis                FAMILY HISTORY  HEART DISEASE  CHF  DIABETES  CARDIAC ARREST  STROKE  CANCER  ANEURYSM                                                             H/O: MI_____   STROKE________   GOUT_____   ANEMIA______     CAROTID________ HIV____ CAD_______ HYPERCHOL _____    H/O: CHF _____   RF____ DM___ HTN_______PVD____THYROID DISEASE_______    PMH: GI ____   HEPATITIS ___ KIDNEY DISEASE ___ LUNG DISEASE _______     SLEEP APNEA ____ BLOOD CLOTS ____ DVT ____ VEIN STRIPPING ___________     CANCER _________________________________ CHEMO/ RADIATION__________

## 2022-08-22 ENCOUNTER — LAB (OUTPATIENT)
Dept: LAB | Facility: HOSPITAL | Age: 62
End: 2022-08-22

## 2022-08-22 ENCOUNTER — OFFICE VISIT (OUTPATIENT)
Dept: CARDIOLOGY | Facility: CLINIC | Age: 62
End: 2022-08-22

## 2022-08-22 VITALS
HEIGHT: 75 IN | BODY MASS INDEX: 26.08 KG/M2 | WEIGHT: 209.8 LBS | HEART RATE: 44 BPM | DIASTOLIC BLOOD PRESSURE: 88 MMHG | SYSTOLIC BLOOD PRESSURE: 120 MMHG

## 2022-08-22 DIAGNOSIS — R00.1 BRADYCARDIA, SINUS: ICD-10-CM

## 2022-08-22 DIAGNOSIS — M79.10 MYALGIA: ICD-10-CM

## 2022-08-22 DIAGNOSIS — Z72.0 TOBACCO USE: ICD-10-CM

## 2022-08-22 DIAGNOSIS — R42 LIGHTHEADEDNESS: ICD-10-CM

## 2022-08-22 DIAGNOSIS — R52 MIGRATORY PAIN: ICD-10-CM

## 2022-08-22 DIAGNOSIS — R07.2 PRECORDIAL PAIN: ICD-10-CM

## 2022-08-22 DIAGNOSIS — R42 LIGHTHEADEDNESS: Primary | ICD-10-CM

## 2022-08-22 LAB
CRP SERPL-MCNC: <0.3 MG/DL (ref 0–0.5)
ERYTHROCYTE [SEDIMENTATION RATE] IN BLOOD: 3 MM/HR (ref 0–20)
TSH SERPL DL<=0.05 MIU/L-ACNC: 3.2 UIU/ML (ref 0.27–4.2)

## 2022-08-22 PROCEDURE — 36415 COLL VENOUS BLD VENIPUNCTURE: CPT

## 2022-08-22 PROCEDURE — 84443 ASSAY THYROID STIM HORMONE: CPT

## 2022-08-22 PROCEDURE — 86140 C-REACTIVE PROTEIN: CPT

## 2022-08-22 PROCEDURE — 86618 LYME DISEASE ANTIBODY: CPT

## 2022-08-22 PROCEDURE — 99204 OFFICE O/P NEW MOD 45 MIN: CPT | Performed by: INTERNAL MEDICINE

## 2022-08-22 PROCEDURE — 93000 ELECTROCARDIOGRAM COMPLETE: CPT | Performed by: INTERNAL MEDICINE

## 2022-08-22 PROCEDURE — 85652 RBC SED RATE AUTOMATED: CPT

## 2022-08-22 NOTE — PROGRESS NOTES
"Date of Office Visit: 22  Encounter Provider: Tim Mathews MD  Place of Service: [facility]  Patient Name: Carson Rosales  :1960    Chief Complaint   Patient presents with   • Chest Pain   :     HPI:     Mr. Rosales is 61 y.o. and presents today in consultation for chest pain and lightheadedness at the request of Dr Tellez.    He is a generally healthy gentleman. He denies any history of hypertension, hyperlipidemia, or diabetes. He does smoke cigarettes. His brother had mitral valve prolapse and required repair.      Approximately six weeks ago, he went to the [place] in [place]. While driving, he became acutely lighteaded, and felt like he may pass out. He had eaten a normal breakfast prior, but nothing too heavy. The sensation waxed and waned and went away after ~ 45 minutes. He did not have vertigo. He was not having any palpitations.    This has happened on and off since then. He cannot identify any provoking or alleviating factors; it really is sporadic/spontaneous. It does happen more so in the morning. He has not fully passed out.      He has had migratory pains in various joints and his chest. The chest pain is a lower sternal aching/\"muscle pulling\" sensation and is not exertional. He exercises on the treadmill 4x/week without any chest pain or dyspnea. Sometimes at night, he notes his heart beating somewhat hard but it's not fast or irregular. He has no leg swelling, orthopnea, or PND.     Past Medical History:   Diagnosis Date   • Colon polyps     hx       Past Surgical History:   Procedure Laterality Date   • COLONOSCOPY  approx 2016    benign polyps per pt    • COLONOSCOPY  10/25/2019    tics, NBIH, inflammation, melanosis coli   • MANDIBLE FRACTURE SURGERY     • UPPER GASTROINTESTINAL ENDOSCOPY  approx 2016    normal per pt    • UPPER GASTROINTESTINAL ENDOSCOPY  10/25/2019    gastritis, duodenitis       Social History " "    Socioeconomic History   • Marital status:    • Number of children: 2   Tobacco Use   • Smoking status: Current Every Day Smoker     Packs/day: 0.25     Types: Cigarettes     Start date: 1976   • Smokeless tobacco: Never Used   Vaping Use   • Vaping Use: Never used   Substance and Sexual Activity   • Alcohol use: Yes     Comment: rarely   • Drug use: No   • Sexual activity: Defer       Family History   Problem Relation Age of Onset   • Mitral valve prolapse Brother    • No Known Problems Brother    • No Known Problems Brother    • Heart attack Maternal Grandfather        Review of Systems   Cardiovascular: Positive for chest pain.   Respiratory: Positive for cough.    Musculoskeletal: Positive for joint pain and myalgias.   Neurological: Positive for excessive daytime sleepiness and light-headedness.   All other systems reviewed and are negative.      No Known Allergies      Current Outpatient Medications:   •  FIBER PO, Take  by mouth., Disp: , Rfl:   •  hydrocortisone-pramoxine (ANALPRAM HC) 2.5-1 % rectal cream, Apply topically to affected area twice a day until  Healed., Disp: 30 g, Rfl: 1  •  pantoprazole (PROTONIX) 20 MG EC tablet, TAKE 1 TABLET BY MOUTH DAILY, Disp: 60 tablet, Rfl: 2  •  Probiotic Product (PROBIOTIC DAILY PO), Take  by mouth Daily., Disp: , Rfl:       Objective:     Vitals:    08/22/22 0931   BP: 120/88   BP Location: Left arm   Pulse: (!) 44   Weight: 95.2 kg (209 lb 12.8 oz)   Height: 190.5 cm (75\")     Body mass index is 26.22 kg/m².    Vitals reviewed.   Constitutional:       Appearance: Healthy appearance. Well-developed and not in distress.   Eyes:      Conjunctiva/sclera: Conjunctivae normal.   HENT:      Head: Normocephalic.      Nose: Nose normal.         Comments: Masked  Neck:      Vascular: No JVD. JVD normal.      Lymphadenopathy: No cervical adenopathy.   Pulmonary:      Effort: Pulmonary effort is normal.      Breath sounds: Normal breath sounds.   Cardiovascular:     "  Bradycardia present. Regular rhythm.      Murmurs: There is no murmur.   Pulses:     Intact distal pulses.   Edema:     Peripheral edema absent.   Abdominal:      Palpations: Abdomen is soft.      Tenderness: There is no abdominal tenderness.   Musculoskeletal: Normal range of motion.      Cervical back: Normal range of motion. Skin:     General: Skin is warm and dry.      Findings: No rash.   Neurological:      General: No focal deficit present.      Mental Status: Alert, oriented to person, place, and time and oriented to person, place and time.      Cranial Nerves: No cranial nerve deficit.   Psychiatric:         Behavior: Behavior normal.         Thought Content: Thought content normal.         Judgment: Judgment normal.           ECG 12 Lead    Date/Time: 8/22/2022 9:41 AM  Performed by: Tim Mathews MD  Authorized by: Tim Mathews MD   Comparison: compared with previous ECG   Similar to previous ECG  Rhythm: sinus bradycardia  Conduction: conduction normal  ST Segments: ST segments normal  T Waves: T waves normal  QRS axis: normal  Other: no other findings    Clinical impression: normal ECG            Assessment:       Diagnosis Plan   1. Lightheadedness  Lyme Disease Total Antibody With Reflex to Immunoassay    TSH Rfx On Abnormal To Free T4    Treadmill Stress Test    Adult Transthoracic Echo Complete w/ Color, Spectral and Contrast if Necessary Per Protocol    Holter Monitor - 72 Hour Up To 15 Days    Sedimentation Rate    C-reactive Protein   2. Bradycardia, sinus  Lyme Disease Total Antibody With Reflex to Immunoassay    TSH Rfx On Abnormal To Free T4    Treadmill Stress Test    Adult Transthoracic Echo Complete w/ Color, Spectral and Contrast if Necessary Per Protocol    Holter Monitor - 72 Hour Up To 15 Days    Sedimentation Rate    C-reactive Protein   3. Precordial pain  ECG 12 Lead    Lyme Disease Total Antibody With Reflex to Immunoassay    TSH Rfx On Abnormal To Free T4    Treadmill Stress  Test    Adult Transthoracic Echo Complete w/ Color, Spectral and Contrast if Necessary Per Protocol    Holter Monitor - 72 Hour Up To 15 Days    Sedimentation Rate    C-reactive Protein   4. Migratory pain  Lyme Disease Total Antibody With Reflex to Immunoassay    TSH Rfx On Abnormal To Free T4    Treadmill Stress Test    Adult Transthoracic Echo Complete w/ Color, Spectral and Contrast if Necessary Per Protocol    Holter Monitor - 72 Hour Up To 15 Days    Sedimentation Rate    C-reactive Protein   5. Myalgia  Lyme Disease Total Antibody With Reflex to Immunoassay    TSH Rfx On Abnormal To Free T4    Treadmill Stress Test    Adult Transthoracic Echo Complete w/ Color, Spectral and Contrast if Necessary Per Protocol    Holter Monitor - 72 Hour Up To 15 Days    Sedimentation Rate    C-reactive Protein   6. Tobacco use  Lyme Disease Total Antibody With Reflex to Immunoassay    TSH Rfx On Abnormal To Free T4    Treadmill Stress Test    Adult Transthoracic Echo Complete w/ Color, Spectral and Contrast if Necessary Per Protocol    Holter Monitor - 72 Hour Up To 15 Days    Sedimentation Rate    C-reactive Protein      Plan:       Mr Rosales is having episodic near syncope/lightheadedness that is not clearly provoked. He has very atypical chest discomfort that occurs at rest and not with exertion. He has sinus bradycardia (with normal intervals). He has migratory arthrialgias/myalgias and reports removing some ticks over the summer.    I will get a treadmill stress test out of an abundance of caution but I have a low suspicion for CAD; I do want to see how his HR responds to exercise. I have recommended an echo to exclude structural heart disease, and a 14 day monitor to rule out arrhythmias.     I'm a bit worried about possible systemic issues and have recommended some lab work as well (TSH, Lyme Ab, ESR/CRP). I explained that I am checking these labs specifically as these issues pertain to cardiac function, but that if  they were normal, that this doesn't exclude any other systemic process that is outside of my scope.    Sincerely,       Tim Mathews MD

## 2022-08-22 NOTE — PATIENT INSTRUCTIONS
Steps to Quit Smoking  Smoking tobacco is the leading cause of preventable death. It can affect almost every organ in the body. Smoking puts you and those around you at risk for developing many serious chronic diseases. Quitting smoking can be difficult, but it is one of the best things that you can do for your health. It is never too late to quit.  How do I get ready to quit?  When you decide to quit smoking, create a plan to help you succeed. Before you quit:  · Pick a date to quit. Set a date within the next 2 weeks to give you time to prepare.  · Write down the reasons why you are quitting. Keep this list in places where you will see it often.  · Tell your family, friends, and co-workers that you are quitting. Support from your loved ones can make quitting easier.  · Talk with your health care provider about your options for quitting smoking.  · Find out what treatment options are covered by your health insurance.  · Identify people, places, things, and activities that make you want to smoke (triggers). Avoid them.  What first steps can I take to quit smoking?  · Throw away all cigarettes at home, at work, and in your car.  · Throw away smoking accessories, such as ashtrays and lighters.  · Clean your car. Make sure to empty the ashtray.  · Clean your home, including curtains and carpets.  What strategies can I use to quit smoking?  Talk with your health care provider about combining strategies, such as taking medicines while you are also receiving in-person counseling. Using these two strategies together makes you more likely to succeed in quitting than if you used either strategy on its own.  · If you are pregnant or breastfeeding, talk with your health care provider about finding counseling or other support strategies to quit smoking. Do not take medicine to help you quit smoking unless your health care provider tells you to do so.  To quit smoking:  Quit right away  · Quit smoking completely, instead of  gradually reducing how much you smoke over a period of time. Research shows that stopping smoking right away is more successful than gradually quitting.  · Attend in-person counseling to help you build problem-solving skills. You are more likely to succeed in quitting if you attend counseling sessions regularly. Even short sessions of 10 minutes can be effective.  Take medicine  You may take medicines to help you quit smoking. Some medicines require a prescription and some you can purchase over-the-counter. Medicines may have nicotine in them to replace the nicotine in cigarettes. Medicines may:  · Help to stop cravings.  · Help to relieve withdrawal symptoms.  Your health care provider may recommend:  · Nicotine patches, gum, or lozenges.  · Nicotine inhalers or sprays.  · Non-nicotine medicine that is taken by mouth.  Find resources  Find resources and support systems that can help you to quit smoking and remain smoke-free after you quit. These resources are most helpful when you use them often. They include:  · Online chats with a counselor.  · Telephone quitlines.  · Printed self-help materials.  · Support groups or group counseling.  · Text messaging programs.  · Mobile phone apps or applications. Use apps that can help you stick to your quit plan by providing reminders, tips, and encouragement. There are many free apps for mobile devices as well as websites. Examples include Quit Guide from the CDC and smokefree.gov  What things can I do to make it easier to quit?    · Reach out to your family and friends for support and encouragement. Call telephone quitlines (6-390-QUIT-NOW), reach out to support groups, or work with a counselor for support.  · Ask people who smoke to avoid smoking around you.  · Avoid places that trigger you to smoke, such as bars, parties, or smoke-break areas at work.  · Spend time with people who do not smoke.  · Lessen the stress in your life. Stress can be a smoking trigger for some  people. To lessen stress, try:  ? Exercising regularly.  ? Doing deep-breathing exercises.  ? Doing yoga.  ? Meditating.  ? Performing a body scan. This involves closing your eyes, scanning your body from head to toe, and noticing which parts of your body are particularly tense. Try to relax the muscles in those areas.  How will I feel when I quit smoking?  Day 1 to 3 weeks  Within the first 24 hours of quitting smoking, you may start to feel withdrawal symptoms. These symptoms are usually most noticeable 2-3 days after quitting, but they usually do not last for more than 2-3 weeks. You may experience these symptoms:  · Mood swings.  · Restlessness, anxiety, or irritability.  · Trouble concentrating.  · Dizziness.  · Strong cravings for sugary foods and nicotine.  · Mild weight gain.  · Constipation.  · Nausea.  · Coughing or a sore throat.  · Changes in how the medicines that you take for unrelated issues work in your body.  · Depression.  · Trouble sleeping (insomnia).  Week 3 and afterward  After the first 2-3 weeks of quitting, you may start to notice more positive results, such as:  · Improved sense of smell and taste.  · Decreased coughing and sore throat.  · Slower heart rate.  · Lower blood pressure.  · Clearer skin.  · The ability to breathe more easily.  · Fewer sick days.  Quitting smoking can be very challenging. Do not get discouraged if you are not successful the first time. Some people need to make many attempts to quit before they achieve long-term success. Do your best to stick to your quit plan, and talk with your health care provider if you have any questions or concerns.  Summary  · Smoking tobacco is the leading cause of preventable death. Quitting smoking is one of the best things that you can do for your health.  · When you decide to quit smoking, create a plan to help you succeed.  · Quit smoking right away, not slowly over a period of time.  · When you start quitting, seek help from your  health care provider, family, or friends.  This information is not intended to replace advice given to you by your health care provider. Make sure you discuss any questions you have with your health care provider.  Document Revised: 09/11/2020 Document Reviewed: 03/07/2020  Elsevier Patient Education © 2021 [employer]vier Inc.

## 2022-08-23 LAB — B BURGDOR IGG+IGM SER QL IA: NEGATIVE

## 2022-09-07 ENCOUNTER — HOSPITAL ENCOUNTER (OUTPATIENT)
Dept: CARDIOLOGY | Facility: HOSPITAL | Age: 62
Discharge: HOME | End: 2022-09-07
Admitting: INTERNAL MEDICINE

## 2022-09-07 VITALS
WEIGHT: 209 LBS | HEART RATE: 45 BPM | SYSTOLIC BLOOD PRESSURE: 156 MMHG | BODY MASS INDEX: 25.99 KG/M2 | HEIGHT: 75 IN | DIASTOLIC BLOOD PRESSURE: 86 MMHG

## 2022-09-07 DIAGNOSIS — R42 LIGHTHEADEDNESS: ICD-10-CM

## 2022-09-07 DIAGNOSIS — M79.10 MYALGIA: ICD-10-CM

## 2022-09-07 DIAGNOSIS — R52 MIGRATORY PAIN: ICD-10-CM

## 2022-09-07 DIAGNOSIS — Z72.0 TOBACCO USE: ICD-10-CM

## 2022-09-07 DIAGNOSIS — R07.2 PRECORDIAL PAIN: ICD-10-CM

## 2022-09-07 DIAGNOSIS — R00.1 BRADYCARDIA, SINUS: ICD-10-CM

## 2022-09-07 LAB
AORTIC ARCH: 2.4 CM
ASCENDING AORTA: 3.9 CM
BH CV ECHO MEAS - ACS: 2.28 CM
BH CV ECHO MEAS - AO MAX PG: 5.6 MMHG
BH CV ECHO MEAS - AO MEAN PG: 3.6 MMHG
BH CV ECHO MEAS - AO ROOT DIAM: 3.7 CM
BH CV ECHO MEAS - AO V2 MAX: 117.9 CM/SEC
BH CV ECHO MEAS - AO V2 VTI: 33.3 CM
BH CV ECHO MEAS - AVA(I,D): 1.83 CM2
BH CV ECHO MEAS - EDV(CUBED): 143.2 ML
BH CV ECHO MEAS - EDV(MOD-SP2): 103 ML
BH CV ECHO MEAS - EDV(MOD-SP4): 132 ML
BH CV ECHO MEAS - EF(MOD-BP): 62.1 %
BH CV ECHO MEAS - EF(MOD-SP2): 60.2 %
BH CV ECHO MEAS - EF(MOD-SP4): 63.6 %
BH CV ECHO MEAS - ESV(CUBED): 35.4 ML
BH CV ECHO MEAS - ESV(MOD-SP2): 41 ML
BH CV ECHO MEAS - ESV(MOD-SP4): 48 ML
BH CV ECHO MEAS - FS: 37.2 %
BH CV ECHO MEAS - IVS/LVPW: 1.3 CM
BH CV ECHO MEAS - IVSD: 1.29 CM
BH CV ECHO MEAS - LAT PEAK E' VEL: 11 CM/SEC
BH CV ECHO MEAS - LV DIASTOLIC VOL/BSA (35-75): 59 CM2
BH CV ECHO MEAS - LV MASS(C)D: 233.9 GRAMS
BH CV ECHO MEAS - LV MAX PG: 2.6 MMHG
BH CV ECHO MEAS - LV MEAN PG: 1.48 MMHG
BH CV ECHO MEAS - LV SYSTOLIC VOL/BSA (12-30): 21.5 CM2
BH CV ECHO MEAS - LV V1 MAX: 80.1 CM/SEC
BH CV ECHO MEAS - LV V1 VTI: 18.8 CM
BH CV ECHO MEAS - LVIDD: 5.2 CM
BH CV ECHO MEAS - LVIDS: 3.3 CM
BH CV ECHO MEAS - LVOT AREA: 3.2 CM2
BH CV ECHO MEAS - LVOT DIAM: 2.03 CM
BH CV ECHO MEAS - LVPWD: 0.99 CM
BH CV ECHO MEAS - MED PEAK E' VEL: 8.6 CM/SEC
BH CV ECHO MEAS - MR MAX PG: 73 MMHG
BH CV ECHO MEAS - MR MAX VEL: 427.1 CM/SEC
BH CV ECHO MEAS - MV A DUR: 0.13 SEC
BH CV ECHO MEAS - MV A MAX VEL: 52.3 CM/SEC
BH CV ECHO MEAS - MV DEC SLOPE: 356.1 CM/SEC2
BH CV ECHO MEAS - MV DEC TIME: 0.25 MSEC
BH CV ECHO MEAS - MV E MAX VEL: 54.8 CM/SEC
BH CV ECHO MEAS - MV E/A: 1.05
BH CV ECHO MEAS - MV MAX PG: 2.21 MMHG
BH CV ECHO MEAS - MV MEAN PG: 0.83 MMHG
BH CV ECHO MEAS - MV P1/2T: 58.1 MSEC
BH CV ECHO MEAS - MV V2 VTI: 32.9 CM
BH CV ECHO MEAS - MVA(P1/2T): 3.8 CM2
BH CV ECHO MEAS - MVA(VTI): 1.85 CM2
BH CV ECHO MEAS - PA ACC TIME: 0.13 SEC
BH CV ECHO MEAS - PA PR(ACCEL): 18.8 MMHG
BH CV ECHO MEAS - PA V2 MAX: 86.9 CM/SEC
BH CV ECHO MEAS - PULM A REVS DUR: 0.11 SEC
BH CV ECHO MEAS - PULM A REVS VEL: 27 CM/SEC
BH CV ECHO MEAS - PULM DIAS VEL: 40.3 CM/SEC
BH CV ECHO MEAS - PULM S/D: 1.02
BH CV ECHO MEAS - PULM SYS VEL: 41.1 CM/SEC
BH CV ECHO MEAS - QP/QS: 1.07
BH CV ECHO MEAS - RV MAX PG: 1.79 MMHG
BH CV ECHO MEAS - RV V1 MAX: 66.8 CM/SEC
BH CV ECHO MEAS - RV V1 VTI: 16.1 CM
BH CV ECHO MEAS - RVOT DIAM: 2.27 CM
BH CV ECHO MEAS - SI(MOD-SP2): 27.7 ML/M2
BH CV ECHO MEAS - SI(MOD-SP4): 37.6 ML/M2
BH CV ECHO MEAS - SUP REN AO DIAM: 2.1 CM
BH CV ECHO MEAS - SV(LVOT): 60.8 ML
BH CV ECHO MEAS - SV(MOD-SP2): 62 ML
BH CV ECHO MEAS - SV(MOD-SP4): 84 ML
BH CV ECHO MEAS - SV(RVOT): 65.1 ML
BH CV ECHO MEAS - TAPSE (>1.6): 2.08 CM
BH CV ECHO MEASUREMENTS AVERAGE E/E' RATIO: 5.59
BH CV STRESS BP STAGE 1: NORMAL
BH CV STRESS BP STAGE 2: NORMAL
BH CV STRESS BP STAGE 3: NORMAL
BH CV STRESS BP STAGE 4: NORMAL
BH CV STRESS DURATION MIN STAGE 1: 3
BH CV STRESS DURATION MIN STAGE 2: 3
BH CV STRESS DURATION MIN STAGE 3: 3
BH CV STRESS DURATION MIN STAGE 4: 3
BH CV STRESS DURATION SEC STAGE 1: 0
BH CV STRESS DURATION SEC STAGE 2: 0
BH CV STRESS DURATION SEC STAGE 3: 0
BH CV STRESS DURATION SEC STAGE 4: 0
BH CV STRESS GRADE STAGE 1: 10
BH CV STRESS GRADE STAGE 2: 12
BH CV STRESS GRADE STAGE 3: 14
BH CV STRESS GRADE STAGE 4: 16
BH CV STRESS HR STAGE 1: 78
BH CV STRESS HR STAGE 2: 102
BH CV STRESS HR STAGE 3: 108
BH CV STRESS HR STAGE 4: 141
BH CV STRESS METS STAGE 1: 5
BH CV STRESS METS STAGE 2: 7.5
BH CV STRESS METS STAGE 3: 10
BH CV STRESS METS STAGE 4: 13.5
BH CV STRESS PROTOCOL 1: NORMAL
BH CV STRESS RECOVERY BP: NORMAL MMHG
BH CV STRESS RECOVERY HR: 75 BPM
BH CV STRESS SPEED STAGE 1: 1.7
BH CV STRESS SPEED STAGE 2: 2.5
BH CV STRESS SPEED STAGE 3: 3.4
BH CV STRESS SPEED STAGE 4: 4.2
BH CV STRESS STAGE 1: 1
BH CV STRESS STAGE 2: 2
BH CV STRESS STAGE 3: 3
BH CV STRESS STAGE 4: 4
BH CV XLRA - RV BASE: 3.2 CM
BH CV XLRA - RV LENGTH: 7.1 CM
BH CV XLRA - RV MID: 2.8 CM
BH CV XLRA - TDI S': 10.1 CM/SEC
LEFT ATRIUM VOLUME INDEX: 32.1 ML/M2
MAXIMAL PREDICTED HEART RATE: 161 BPM
MAXIMAL PREDICTED HEART RATE: 161 BPM
PERCENT MAX PREDICTED HR: 87.58 %
SINUS: 3.9 CM
STJ: 3.3 CM
STRESS BASELINE BP: NORMAL MMHG
STRESS BASELINE HR: 48 BPM
STRESS PERCENT HR: 103 %
STRESS POST ESTIMATED WORKLOAD: 13 METS
STRESS POST EXERCISE DUR MIN: 12 MIN
STRESS POST EXERCISE DUR SEC: 0 SEC
STRESS POST PEAK BP: NORMAL MMHG
STRESS POST PEAK HR: 141 BPM
STRESS TARGET HR: 137 BPM
STRESS TARGET HR: 137 BPM

## 2022-09-07 PROCEDURE — 93018 CV STRESS TEST I&R ONLY: CPT | Performed by: INTERNAL MEDICINE

## 2022-09-07 PROCEDURE — 93017 CV STRESS TEST TRACING ONLY: CPT

## 2022-09-07 PROCEDURE — 93016 CV STRESS TEST SUPVJ ONLY: CPT | Performed by: INTERNAL MEDICINE

## 2022-09-07 PROCEDURE — 93306 TTE W/DOPPLER COMPLETE: CPT

## 2022-09-07 PROCEDURE — 93306 TTE W/DOPPLER COMPLETE: CPT | Performed by: INTERNAL MEDICINE

## 2024-03-04 ENCOUNTER — OFFICE VISIT (OUTPATIENT)
Dept: CARDIOLOGY | Facility: CLINIC | Age: 64
End: 2024-03-04
Payer: COMMERCIAL

## 2024-03-04 VITALS
DIASTOLIC BLOOD PRESSURE: 78 MMHG | WEIGHT: 203 LBS | HEIGHT: 75 IN | SYSTOLIC BLOOD PRESSURE: 120 MMHG | HEART RATE: 44 BPM | BODY MASS INDEX: 25.24 KG/M2

## 2024-03-04 DIAGNOSIS — M54.6 CHRONIC MIDLINE THORACIC BACK PAIN: Primary | ICD-10-CM

## 2024-03-04 DIAGNOSIS — G89.29 CHRONIC MIDLINE THORACIC BACK PAIN: Primary | ICD-10-CM

## 2024-03-04 DIAGNOSIS — R00.1 SINUS BRADYCARDIA: ICD-10-CM

## 2024-03-04 DIAGNOSIS — R14.2 BELCHING: ICD-10-CM

## 2024-03-04 DIAGNOSIS — R07.89 CHEST TIGHTNESS: ICD-10-CM

## 2024-03-04 DIAGNOSIS — R42 LIGHTHEADEDNESS: ICD-10-CM

## 2024-03-04 DIAGNOSIS — F17.210 CIGARETTE SMOKER: ICD-10-CM

## 2024-03-04 DIAGNOSIS — F40.240 CLAUSTROPHOBIA: ICD-10-CM

## 2024-03-04 DIAGNOSIS — F41.9 ANXIETY: ICD-10-CM

## 2024-03-04 PROBLEM — M54.12 CERVICAL RADICULOPATHY: Status: ACTIVE | Noted: 2023-09-12

## 2024-03-04 PROBLEM — M51.34 DEGENERATION OF THORACIC INTERVERTEBRAL DISC: Status: ACTIVE | Noted: 2023-09-12

## 2024-03-04 PROBLEM — M51.369 DEGENERATION OF LUMBAR INTERVERTEBRAL DISC: Status: ACTIVE | Noted: 2023-09-12

## 2024-03-04 PROBLEM — M54.2 NECK PAIN: Status: ACTIVE | Noted: 2023-09-12

## 2024-03-04 PROBLEM — M50.30 DDD (DEGENERATIVE DISC DISEASE), CERVICAL: Status: ACTIVE | Noted: 2023-09-12

## 2024-03-04 PROBLEM — M51.36 DEGENERATION OF LUMBAR INTERVERTEBRAL DISC: Status: ACTIVE | Noted: 2023-09-12

## 2024-03-04 PROCEDURE — 1159F MED LIST DOCD IN RCRD: CPT | Performed by: NURSE PRACTITIONER

## 2024-03-04 PROCEDURE — 93000 ELECTROCARDIOGRAM COMPLETE: CPT | Performed by: NURSE PRACTITIONER

## 2024-03-04 PROCEDURE — 99214 OFFICE O/P EST MOD 30 MIN: CPT | Performed by: NURSE PRACTITIONER

## 2024-03-04 PROCEDURE — 1160F RVW MEDS BY RX/DR IN RCRD: CPT | Performed by: NURSE PRACTITIONER

## 2024-03-04 NOTE — PROGRESS NOTES
Date of Office Visit: 2024  Encounter Provider: STEPHEN Cutler  Place of Service: [facility] CARDIOLOGY  Patient Name: Carson Rosales  :1960  Primary Cardiologist: Dr. Tim Mathews    Chief Complaint   Patient presents with    Back Pain    Chest Pain   :     HPI: Carson Rosales is a 63 y.o. male who presents today for evaluation of lightheadedness, belching, and certain pressure.  He is a new patient to me and I reviewed his medical records.    In , he was driving and became acutely lightheaded and near syncopal.  He did not truly passed out.  He continued to have intermittent symptoms and saw Dr. Tim Mathews in 2022.  He also reported lower sternal aching/muscle pulling sensation that was not exertional.  He is also having arthralgias.  Lyme disease lab, TSH, sed rate, and C-reactive protein were all normal.  Treadmill stress test and echocardiogram normal.  30-day event monitor showed normal sinus rhythm, average heart rate 51 bpm, rare PVCs, and 2 brief episodes of ventricular tachycardia.    He reports having back pain/issues for 20 years. Two weeks ago he had pain in the middle of his back and said he had a knot.  He said this was accompanied with belching and chest tightness intermittently for 4 days.  A whirlpool bath helps resolve his symptoms.  He is not seeing anyone for back issues right now.  He has intermittent chest tightness but never associated with exercise.  He exercises frequently.  He had recent diarrhea and lightheadedness.  He reports having anxiety, panic attacks, claustrophobia, trouble swallowing, and belching.  He denies palpitations or syncope.  Heart rate always runs low and he is asymptomatic today.  Blood pressure on recheck was normal.      Past Medical History:   Diagnosis Date    Anxiety     Cervical radiculopathy 2023    Cigarette smoker     Claustrophobia     Colon polyps     hx    DDD (degenerative  disc disease), cervical 09/12/2023    Degeneration of lumbar intervertebral disc 09/12/2023    Degeneration of thoracic intervertebral disc 09/12/2023    Gastroesophageal reflux disease 07/09/2019    Added automatically from request for surgery [id number]      Irritable bowel syndrome with both constipation and diarrhea 07/09/2019    Added automatically from request for surgery [id number]      Sinus bradycardia        Past Surgical History:   Procedure Laterality Date    COLONOSCOPY  approx 2016    benign polyps per pt     COLONOSCOPY  10/25/2019    tics, NBIH, inflammation, melanosis coli    MANDIBLE FRACTURE SURGERY      UPPER GASTROINTESTINAL ENDOSCOPY  approx 2016    normal per pt     UPPER GASTROINTESTINAL ENDOSCOPY  10/25/2019    gastritis, duodenitis       Social History     Socioeconomic History    Marital status:     Number of children: 2   Tobacco Use    Smoking status: Every Day     Current packs/day: 0.25     Average packs/day: 0.2 packs/day for 48.2 years (12.0 ttl pk-yrs)     Types: Cigarettes     Start date: 1976    Smokeless tobacco: Never   Vaping Use    Vaping status: Never Used   Substance and Sexual Activity    Alcohol use: Yes     Comment: rarely    Drug use: No    Sexual activity: Defer       Family History   Problem Relation Age of Onset    Mitral valve prolapse Brother     No Known Problems Brother     No Known Problems Brother     Heart attack Maternal Grandfather        The following portion of the patient's history were reviewed and updated as appropriate: past medical history, past surgical history, past social history, past family history, allergies, current medications, and problem list.    Review of Systems   Constitutional: Negative.   Cardiovascular:  Positive for chest pain.   Respiratory: Negative.     Hematologic/Lymphatic: Negative.    Musculoskeletal:  Positive for back pain.   Gastrointestinal:  Positive for diarrhea.   Neurological:  Positive for light-headedness.       No  "Known Allergies    No current outpatient medications on file.         Objective:     Vitals:    03/04/24 0854 03/04/24 0929   BP: 140/85 120/78   BP Location:  Left arm   Patient Position:  Sitting   Pulse: (!) 44    Weight: 92.1 kg (203 lb)    Height: 190.5 cm (75\")      Body mass index is 25.37 kg/m².    PHYSICAL EXAM:    Vitals Reviewed.   General Appearance: No acute distress, well developed and well nourished.   Eyes: Glasses.   HENT: No hearing loss noted.    Respiratory: No signs of respiratory distress. Respiration rhythm and depth normal.  Clear to auscultation.   Cardiovascular:  Jugular Venous Pressure: Normal  Heart Rate and Rhythm: Normal rhythm.  Bradycardic.  Heart Sounds: Normal S1 and S2. No S3 or S4 noted.  Murmurs: No murmurs noted. No rubs, thrills, or gallops.   Lower Extremities: No edema noted.  Musculoskeletal: Normal movement of extremities.  Skin: General appearance normal.    Psychiatric: Patient alert and oriented to person, place, and time. Speech and behavior appropriate. Normal mood and affect.       ECG 12 Lead    Date/Time: 3/4/2024 9:01 AM  Performed by: Eulalia Miller APRN    Authorized by: Eulalia Miller APRN  Comparison: compared with previous ECG from 8/22/2022  Similar to previous ECG  Rhythm: sinus bradycardia  Rate: bradycardic  BPM: 44  Conduction: conduction normal  ST Elevation: II and aVF  T Waves: T waves normal  QRS axis: normal  Other findings: non-specific ST-T wave changes    Clinical impression: non-specific ECG            Assessment:       Diagnosis Plan   1. Chronic midline thoracic back pain        2. Chest tightness        3. Belching        4. Lightheadedness        5. Sinus bradycardia        6. Claustrophobia        7. Anxiety        8. Cigarette smoker               Plan:       Two weeks ago he had back pain associated with chest tightness and belching.  He said a whirlpool bath helped his symptoms.  He exercises frequently and never has chest " tightness pain or shortness of breath.  He said he has had chronic back issues for 20 years.  EKG tracing today shows sinus bradycardia which has been chronic and mild ST elevation in the inferior leads felt to be nonspecific.  This was the same EKG that he had in 2022 and his treadmill stress test was normal.  I have low suspicion that these symptoms are cardiac related.  He will be seeing his PCP and gastroenterologist this week and asked him to discuss with them.  He does have low resting heart rate, but is asymptomatic.  I think his lightheadedness is more related to electrolyte imbalance or dehydration when he is having diarrhea.    I asked if he wanted to follow-up with Dr. Mathews in the future and he said on an as-needed basis.  I asked him to call with any further cardiac concerns.  He would highly benefit from abstaining from cigarette smoking.    ADDENDUM 3/7/2024:  Dr. Mathews recommended proceeding with treadmill stress test.  Patient will be informed.    As always, it has been a pleasure to participate in your patient's care. Thank you.         Sincerely,         STEPHEN Daugherty  [facility] Cardiology      Dictated utilizing Dragon Dictation  I spent 31 minutes reviewing her medical records/testing/previous office notes/labs, face-to-face interaction with patient, physical examination, formulating the plan of care, and discussion of plan of care with patient.

## 2024-03-07 ENCOUNTER — TELEPHONE (OUTPATIENT)
Dept: CARDIOLOGY | Facility: CLINIC | Age: 64
End: 2024-03-07
Payer: COMMERCIAL

## 2024-03-07 DIAGNOSIS — R07.2 PRECORDIAL PAIN: Primary | ICD-10-CM

## 2024-03-07 NOTE — PROGRESS NOTES
"Chief Complaint  Diarrhea and Difficulty Swallowing    Subjective          History of Present Illness    Carson Rosales is a  63 y.o. male presents for follow-up on diarrhea.  He is a patient of Dr. Vale and was last seen by her in the office 12/16/2019 for difficulty swallowing, diarrhea, hemorrhoids.  He is new to me.    About 2-3 weeks about he reports having 14 day worth of diarrhea - tried OTC medications which did not help at all.  This diarrhea has since slowed but he does not have loose stools-although he says he has had issues with diarrhea for many years.  Reports that since he had this increase in diarrhea he also had worsening dyspepsia, and pain in the esophagus.  He does follow with cardiology and saw them on March 4 who said they have low suspicion that this pain was cardiac in nature and recommended GI follow-up.  He reports he has had difficulty swallowing for 2 years intermittently-typically with solids but he cannot identify certain food that this is worse with.  Denies any black or bloody stools.  No fevers/chills, nausea or vomiting.  Typically has 2 bowel movements per day which almost always loose.    Colonoscopy 10/25/2019 showed diverticulosis, nonbleeding internal hemorrhoids, scattered mild inflammation in sigmoid colon, normal mucosa in descending colon.  He does have a history of adenomatous polyps so he is due for colonoscopy this year.       Objective   Vital Signs:   /76   Pulse 50   Temp 96.6 °F (35.9 °C)   Ht 193 cm (76\")   Wt 92.1 kg (203 lb)   BMI 24.71 kg/m²       Physical Exam  Constitutional:       General: He is not in acute distress.     Appearance: Normal appearance.   Eyes:      General: No scleral icterus.  Cardiovascular:      Rate and Rhythm: Normal rate.   Pulmonary:      Effort: Pulmonary effort is normal.   Abdominal:      General: Abdomen is flat. Bowel sounds are normal. There is no distension.      Tenderness: There is no abdominal tenderness. " There is no guarding.   Skin:     Coloration: Skin is not jaundiced.   Neurological:      General: No focal deficit present.      Mental Status: He is alert and oriented to person, place, and time.   Psychiatric:         Mood and Affect: Mood normal.         Behavior: Behavior normal.          Result Review :   The following data was reviewed by: Ana Rosa Link PA-C on 03/08/2024:              Assessment:   Diagnoses and all orders for this visit:    1. Encounter for screening for malignant neoplasm of colon (Primary)  -     Case Request; Standing  -     Case Request    2. Dysphagia, unspecified type  -     Case Request; Standing  -     Case Request    3. Gastroesophageal reflux disease, unspecified whether esophagitis present  -     Case Request; Standing  -     Case Request    4. Dyspepsia  -     Case Request; Standing  -     Case Request    5. Diarrhea, unspecified type  -     Clostridioides difficile Toxin, PCR - Stool, Per Rectum  -     Clostridioides difficile EIA - Stool, Per Rectum  -     Calprotectin, Fecal - Stool, Per Rectum  -     Ova & Parasite Examination - Stool, Per Rectum  -     Stool Culture (Reference Lab) - Stool, Per Rectum  -     Giardia / Cryptosporidium Screen - Stool, Per Rectum    Other orders  -     Implement Anesthesia orders day of procedure.; Standing  -     Follow Anesthesia Guidelines / Protocol; Future  -     Prepare and Witness Surgical Consent Form; Future  -     Verify bowel prep was successful; Standing  -     Give tap water enema if bowel prep was insufficient; Standing  -     Prepare and Witness Surgical Consent Form; Standing          Plan:   -Suspect the increase in diarrhea was infectious in nature, does seem to be improving but will check stool studies today.  -Recommend pantoprazole daily to help with esophageal discomfort and dyspepsia  -He is due for screening colonoscopy this year, due to difficulty swallowing and worsening dyspepsia we will go ahead and add EGD onto  this for further evaluation  -Further plans following endoscopic evaluation      Follow Up   No follow-ups on file.    Dragon dictation used throughout this note.         Ana Rosa Link PA-C  [facility]  [address] [place] [address]  Office: [phone]

## 2024-03-07 NOTE — TELEPHONE ENCOUNTER
Dr. Mathews recommends a treadmill stress test to be completed.  I tried calling patient and there was no answer.  I left a message for him to call back to discuss.

## 2024-03-07 NOTE — TELEPHONE ENCOUNTER
----- Message from Tim Mathews MD sent at 3/5/2024  9:24 AM EST -----  I agree that it sounds very atypical, but I think a plain TMT is reasonable, since it's been more than one year.    Jdk  ----- Message -----  From: Eulalia Miller APRN  Sent: 3/4/2024   9:42 AM EST  To: Tim Mathews MD    Please advise anything further. Thanks.

## 2024-03-08 ENCOUNTER — OFFICE VISIT (OUTPATIENT)
Dept: GASTROENTEROLOGY | Facility: CLINIC | Age: 64
End: 2024-03-08
Payer: COMMERCIAL

## 2024-03-08 ENCOUNTER — TELEPHONE (OUTPATIENT)
Dept: GASTROENTEROLOGY | Facility: CLINIC | Age: 64
End: 2024-03-08
Payer: COMMERCIAL

## 2024-03-08 VITALS
HEIGHT: 76 IN | WEIGHT: 203 LBS | HEART RATE: 50 BPM | SYSTOLIC BLOOD PRESSURE: 131 MMHG | BODY MASS INDEX: 24.72 KG/M2 | TEMPERATURE: 96.6 F | DIASTOLIC BLOOD PRESSURE: 76 MMHG

## 2024-03-08 DIAGNOSIS — R19.7 DIARRHEA, UNSPECIFIED TYPE: ICD-10-CM

## 2024-03-08 DIAGNOSIS — Z12.11 ENCOUNTER FOR SCREENING FOR MALIGNANT NEOPLASM OF COLON: Primary | ICD-10-CM

## 2024-03-08 DIAGNOSIS — K21.9 GASTROESOPHAGEAL REFLUX DISEASE, UNSPECIFIED WHETHER ESOPHAGITIS PRESENT: Primary | ICD-10-CM

## 2024-03-08 DIAGNOSIS — R10.13 DYSPEPSIA: ICD-10-CM

## 2024-03-08 DIAGNOSIS — K21.9 GASTROESOPHAGEAL REFLUX DISEASE, UNSPECIFIED WHETHER ESOPHAGITIS PRESENT: ICD-10-CM

## 2024-03-08 DIAGNOSIS — R13.10 DYSPHAGIA, UNSPECIFIED TYPE: ICD-10-CM

## 2024-03-08 RX ORDER — PANTOPRAZOLE SODIUM 40 MG/1
40 TABLET, DELAYED RELEASE ORAL DAILY
Qty: 90 TABLET | Refills: 3 | Status: SHIPPED | OUTPATIENT
Start: 2024-03-08

## 2024-03-08 NOTE — Clinical Note
63-year-old male with history of diarrhea comes in with acute worsening of diarrhea for 2 weeks about a week ago-symptoms are starting to improve now.  Suspect infectious cause.  Ordered stool studies.  Also reports 2 years of worsening difficulty swallowing along with newer onset dyspepsia and pain in the esophagus/chest.  Followed up with cardiology who had low suspicion for cardiac etiology.  He is due for colonoscopy this year, though added on EGD as well.  Recommended PPI in the meantime

## 2024-03-08 NOTE — TELEPHONE ENCOUNTER
Advised that [facility] will call with final arrival time minimum 24 hrs before procedure. IF they do not get a phone call, arrival time will stay the same as given on instructions [facility] FOR - 03/28/2024  OK FOR HUB TO READ    Will do a trial of Wegovy 0.5 mg weekly.  Will have patient back in 8 weeks to assess for weight loss and titrate up the medication.

## 2024-03-08 NOTE — TELEPHONE ENCOUNTER
Patient called back the answering service and I am on-call.  I explained that Dr. Mathews recommended treadmill stress test to be completed, but she has low suspicion that his symptoms are cardiac.  He verbalized understanding.

## 2024-03-13 LAB
C DIFF TOX A+B STL QL IA: NEGATIVE
C DIFF TOX GENS STL QL NAA+PROBE: NEGATIVE
CRYPTOSP AG STL QL IA: NEGATIVE
G LAMBLIA AG STL QL IA: NEGATIVE

## 2024-03-15 ENCOUNTER — HOSPITAL ENCOUNTER (OUTPATIENT)
Dept: CARDIOLOGY | Facility: HOSPITAL | Age: 64
Discharge: HOME | End: 2024-03-15
Payer: COMMERCIAL

## 2024-03-15 ENCOUNTER — TELEPHONE (OUTPATIENT)
Dept: CARDIOLOGY | Facility: CLINIC | Age: 64
End: 2024-03-15
Payer: COMMERCIAL

## 2024-03-15 DIAGNOSIS — R07.2 PRECORDIAL PAIN: ICD-10-CM

## 2024-03-15 LAB
BH CV STRESS BP STAGE 1: NORMAL
BH CV STRESS BP STAGE 2: NORMAL
BH CV STRESS BP STAGE 3: NORMAL
BH CV STRESS BP STAGE 4: NORMAL
BH CV STRESS DURATION MIN STAGE 1: 3
BH CV STRESS DURATION MIN STAGE 2: 3
BH CV STRESS DURATION MIN STAGE 3: 3
BH CV STRESS DURATION MIN STAGE 4: 2
BH CV STRESS DURATION SEC STAGE 1: 0
BH CV STRESS DURATION SEC STAGE 2: 0
BH CV STRESS DURATION SEC STAGE 3: 0
BH CV STRESS DURATION SEC STAGE 4: 0
BH CV STRESS GRADE STAGE 1: 10
BH CV STRESS GRADE STAGE 2: 12
BH CV STRESS GRADE STAGE 3: 14
BH CV STRESS GRADE STAGE 4: 16
BH CV STRESS HR STAGE 1: 78
BH CV STRESS HR STAGE 2: 95
BH CV STRESS HR STAGE 3: 124
BH CV STRESS HR STAGE 4: 152
BH CV STRESS METS STAGE 1: 5
BH CV STRESS METS STAGE 2: 7.5
BH CV STRESS METS STAGE 3: 10
BH CV STRESS METS STAGE 4: 13
BH CV STRESS PROTOCOL 1: NORMAL
BH CV STRESS RECOVERY BP: NORMAL MMHG
BH CV STRESS RECOVERY HR: 80 BPM
BH CV STRESS SPEED STAGE 1: 1.7
BH CV STRESS SPEED STAGE 2: 2.5
BH CV STRESS SPEED STAGE 3: 3.4
BH CV STRESS SPEED STAGE 4: 4.2
BH CV STRESS STAGE 1: 1
BH CV STRESS STAGE 2: 2
BH CV STRESS STAGE 3: 3
BH CV STRESS STAGE 4: 4
MAXIMAL PREDICTED HEART RATE: 159 BPM
PERCENT MAX PREDICTED HR: 95.6 %
STRESS BASELINE BP: NORMAL MMHG
STRESS BASELINE HR: 50 BPM
STRESS PERCENT HR: 112 %
STRESS POST ESTIMATED WORKLOAD: 13 METS
STRESS POST EXERCISE DUR MIN: 11 MIN
STRESS POST EXERCISE DUR SEC: 0 SEC
STRESS POST PEAK BP: NORMAL MMHG
STRESS POST PEAK HR: 152 BPM
STRESS TARGET HR: 135 BPM

## 2024-03-15 PROCEDURE — 93017 CV STRESS TEST TRACING ONLY: CPT

## 2024-03-15 NOTE — TELEPHONE ENCOUNTER
Results and recommendations called to pt.  Instructed to call with any further questions or concerns.  Verbalized understanding.    Silvina Benjamin RN  Triage Nurse, [facility]  03/15/24 14:34 EDT

## 2024-03-15 NOTE — PROGRESS NOTES
Please call patient.  Great news.  Treadmill stress test was normal.  We would highly recommend that he abstain from cigarette smoking.  He can follow-up with us as needed.  Thank you

## 2024-03-15 NOTE — TELEPHONE ENCOUNTER
----- Message from STEPHEN Mistry sent at 3/15/2024  2:01 PM EDT -----  Please call patient.  Great news.  Treadmill stress test was normal.  We would highly recommend that he abstain from cigarette smoking.  He can follow-up with us as needed.  Thank you

## 2024-03-17 LAB — CALPROTECTIN STL-MCNT: 13 UG/G (ref 0–120)

## 2024-03-26 ENCOUNTER — TRANSCRIBE ORDERS (OUTPATIENT)
Dept: ADMINISTRATIVE | Facility: HOSPITAL | Age: 64
End: 2024-03-26
Payer: COMMERCIAL

## 2024-03-26 DIAGNOSIS — M54.9 DORSALGIA: Primary | ICD-10-CM

## 2024-03-27 ENCOUNTER — TELEPHONE (OUTPATIENT)
Dept: GASTROENTEROLOGY | Facility: CLINIC | Age: 64
End: 2024-03-27

## 2024-03-27 NOTE — TELEPHONE ENCOUNTER
"        Hub staff attempted to follow warm transfer process and was unsuccessful     Caller: Carson Rosales \"EVENS\"    Relationship to patient: Self    Best call back number: [phone]     Patient is needing: PT IS CALLING TO SEE WHAT TIME HE NEEDS TO ARRIVE TOMORROW AT [facility]. THEY HAVE NOT CALLED HIM YET AND THERE IS NO TIME ON HIS INSTRUCTIONS. PLEASE CALL PT BACK AND ADVISE        "

## 2024-03-27 NOTE — TELEPHONE ENCOUNTER
"        Hub staff attempted to follow warm transfer process and was unsuccessful     Caller: Carson Rosales \"EVENS\"    Relationship to patient: Self    Best call back number: [phone]     Patient is needing: PT IS CALLING TO SEE WHAT TIME HE NEEDS TO ARRIVE TOMORROW AT [facility]. THEY HAVE NOT CALLED HIM YET AND THERE IS NO TIME ON HIS INSTRUCTIONS. PLEASE CALL PT BACK AND ADVISE        "

## 2024-03-28 ENCOUNTER — OUTSIDE FACILITY SERVICE (OUTPATIENT)
Dept: GASTROENTEROLOGY | Facility: CLINIC | Age: 64
End: 2024-03-28
Payer: COMMERCIAL

## 2024-03-28 ENCOUNTER — LAB REQUISITION (OUTPATIENT)
Dept: LAB | Facility: HOSPITAL | Age: 64
End: 2024-03-28
Payer: COMMERCIAL

## 2024-03-28 DIAGNOSIS — K21.9 GASTROESOPHAGEAL REFLUX DISEASE WITHOUT ESOPHAGITIS: ICD-10-CM

## 2024-03-28 PROCEDURE — 88305 TISSUE EXAM BY PATHOLOGIST: CPT | Performed by: INTERNAL MEDICINE

## 2024-03-28 RX ORDER — OMEPRAZOLE 20 MG/1
20 CAPSULE, DELAYED RELEASE ORAL 2 TIMES DAILY
Qty: 84 CAPSULE | Refills: 0 | Status: SHIPPED | OUTPATIENT
Start: 2024-03-28 | End: 2024-05-09

## 2024-03-29 LAB
LAB AP CASE REPORT: NORMAL
PATH REPORT.FINAL DX SPEC: NORMAL
PATH REPORT.GROSS SPEC: NORMAL

## 2024-04-03 ENCOUNTER — TELEPHONE (OUTPATIENT)
Dept: GASTROENTEROLOGY | Facility: CLINIC | Age: 64
End: 2024-04-03
Payer: COMMERCIAL

## 2024-04-03 NOTE — TELEPHONE ENCOUNTER
----- Message from Ana Rosa Link PA-C sent at 4/2/2024  3:14 PM EDT -----  Stool studies negative

## 2024-04-09 ENCOUNTER — TELEPHONE (OUTPATIENT)
Dept: GASTROENTEROLOGY | Facility: CLINIC | Age: 64
End: 2024-04-09
Payer: COMMERCIAL

## 2024-04-09 NOTE — TELEPHONE ENCOUNTER
----- Message from Erica Vale MD sent at 4/8/2024 10:49 AM EDT -----  Mild nonspecific inflammation seen on stomach and small bowel biopsy.  Recommend protonix 20 mg bid x 6 weeks    The polyp(s) biopsies showed adenomatous change. This is not cancerous but is considered potentially precancerous. Follow-up colonoscopy in 5 years is advised.

## 2024-04-09 NOTE — TELEPHONE ENCOUNTER
Called pt and advised of Dr Baldwin' note. Verb understanding.     C/s placed in recall and  hm for 03/28/2029.

## 2024-04-24 ENCOUNTER — HOSPITAL ENCOUNTER (OUTPATIENT)
Dept: MRI IMAGING | Facility: HOSPITAL | Age: 64
Discharge: HOME | End: 2024-04-24
Payer: COMMERCIAL

## 2024-05-13 ENCOUNTER — LAB (OUTPATIENT)
Dept: LAB | Facility: HOSPITAL | Age: 64
End: 2024-05-13
Payer: COMMERCIAL

## 2024-05-13 ENCOUNTER — TRANSCRIBE ORDERS (OUTPATIENT)
Dept: ADMINISTRATIVE | Facility: HOSPITAL | Age: 64
End: 2024-05-13
Payer: COMMERCIAL

## 2024-05-13 DIAGNOSIS — N13.8 ENLARGED PROSTATE WITH URINARY OBSTRUCTION: Primary | ICD-10-CM

## 2024-05-13 DIAGNOSIS — N40.1 ENLARGED PROSTATE WITH URINARY OBSTRUCTION: ICD-10-CM

## 2024-05-13 DIAGNOSIS — R97.20 ELEVATED PROSTATE SPECIFIC ANTIGEN (PSA): Primary | ICD-10-CM

## 2024-05-13 DIAGNOSIS — R97.20 ELEVATED PROSTATE SPECIFIC ANTIGEN (PSA): ICD-10-CM

## 2024-05-13 DIAGNOSIS — N40.1 ENLARGED PROSTATE WITH URINARY OBSTRUCTION: Primary | ICD-10-CM

## 2024-05-13 DIAGNOSIS — N13.8 ENLARGED PROSTATE WITH URINARY OBSTRUCTION: ICD-10-CM

## 2024-05-13 LAB
ANION GAP SERPL CALCULATED.3IONS-SCNC: 8 MMOL/L (ref 5–15)
BUN SERPL-MCNC: 13 MG/DL (ref 8–23)
BUN/CREAT SERPL: 11.2 (ref 7–25)
CALCIUM SPEC-SCNC: 9.1 MG/DL (ref 8.6–10.5)
CHLORIDE SERPL-SCNC: 104 MMOL/L (ref 98–107)
CO2 SERPL-SCNC: 29 MMOL/L (ref 22–29)
CREAT SERPL-MCNC: 1.16 MG/DL (ref 0.76–1.27)
EGFRCR SERPLBLD CKD-EPI 2021: 71.7 ML/MIN/1.73
GLUCOSE SERPL-MCNC: 106 MG/DL (ref 65–99)
POTASSIUM SERPL-SCNC: 4.6 MMOL/L (ref 3.5–5.2)
PSA SERPL-MCNC: 3.36 NG/ML (ref 0–4)
SODIUM SERPL-SCNC: 141 MMOL/L (ref 136–145)

## 2024-05-13 PROCEDURE — 80048 BASIC METABOLIC PNL TOTAL CA: CPT | Performed by: NURSE PRACTITIONER

## 2024-05-13 PROCEDURE — 84153 ASSAY OF PSA TOTAL: CPT

## 2024-05-13 PROCEDURE — 36415 COLL VENOUS BLD VENIPUNCTURE: CPT | Performed by: NURSE PRACTITIONER

## 2024-05-13 NOTE — PROGRESS NOTES
05/14/24 0001   Pre-Procedure Phone Call   Procedure Time Verified Yes   Arrival Time 1215   Procedure Location Verified Yes   Medical History Reviewed Yes   NPO Status Reinforced Yes   Ride and Caregiver Arranged Yes   Phone Number for Ride/Caregiver Renetta Rosales - [phone]   Patient Knows to Bring Current Medications Yes   Bring Outside Films Requested No

## 2024-05-14 ENCOUNTER — HOSPITAL ENCOUNTER (OUTPATIENT)
Dept: MRI IMAGING | Facility: HOSPITAL | Age: 64
Discharge: HOME | End: 2024-05-14
Admitting: NURSE PRACTITIONER
Payer: COMMERCIAL

## 2024-07-22 ENCOUNTER — TRANSCRIBE ORDERS (OUTPATIENT)
Dept: ADMINISTRATIVE | Facility: HOSPITAL | Age: 64
End: 2024-07-22
Payer: COMMERCIAL

## 2024-07-22 ENCOUNTER — LAB (OUTPATIENT)
Dept: LAB | Facility: HOSPITAL | Age: 64
End: 2024-07-22
Payer: COMMERCIAL

## 2024-07-22 ENCOUNTER — TRANSCRIBE ORDERS (OUTPATIENT)
Dept: LAB | Facility: HOSPITAL | Age: 64
End: 2024-07-22
Payer: COMMERCIAL

## 2024-07-22 DIAGNOSIS — M54.50 LOW BACK PAIN, UNSPECIFIED BACK PAIN LATERALITY, UNSPECIFIED CHRONICITY, UNSPECIFIED WHETHER SCIATICA PRESENT: Primary | ICD-10-CM

## 2024-07-22 DIAGNOSIS — Z01.82: Primary | ICD-10-CM

## 2024-07-22 DIAGNOSIS — Z01.82: ICD-10-CM

## 2024-07-22 LAB
ANION GAP SERPL CALCULATED.3IONS-SCNC: 8.3 MMOL/L (ref 5–15)
BUN SERPL-MCNC: 13 MG/DL (ref 8–23)
BUN/CREAT SERPL: 10.5 (ref 7–25)
CALCIUM SPEC-SCNC: 9.1 MG/DL (ref 8.6–10.5)
CHLORIDE SERPL-SCNC: 102 MMOL/L (ref 98–107)
CO2 SERPL-SCNC: 28.7 MMOL/L (ref 22–29)
CREAT SERPL-MCNC: 1.24 MG/DL (ref 0.76–1.27)
DEPRECATED RDW RBC AUTO: 42.5 FL (ref 37–54)
EGFRCR SERPLBLD CKD-EPI 2021: 66.1 ML/MIN/1.73
ERYTHROCYTE [DISTWIDTH] IN BLOOD BY AUTOMATED COUNT: 12.4 % (ref 12.3–15.4)
GLUCOSE SERPL-MCNC: 117 MG/DL (ref 65–99)
HCT VFR BLD AUTO: 42.4 % (ref 37.5–51)
HGB BLD-MCNC: 14.1 G/DL (ref 13–17.7)
MCH RBC QN AUTO: 31.4 PG (ref 26.6–33)
MCHC RBC AUTO-ENTMCNC: 33.3 G/DL (ref 31.5–35.7)
MCV RBC AUTO: 94.4 FL (ref 79–97)
PLATELET # BLD AUTO: 268 10*3/MM3 (ref 140–450)
PMV BLD AUTO: 9.9 FL (ref 6–12)
POTASSIUM SERPL-SCNC: 4.5 MMOL/L (ref 3.5–5.2)
RBC # BLD AUTO: 4.49 10*6/MM3 (ref 4.14–5.8)
SODIUM SERPL-SCNC: 139 MMOL/L (ref 136–145)
WBC NRBC COR # BLD AUTO: 5.92 10*3/MM3 (ref 3.4–10.8)

## 2024-07-22 PROCEDURE — 85027 COMPLETE CBC AUTOMATED: CPT

## 2024-07-22 PROCEDURE — 36415 COLL VENOUS BLD VENIPUNCTURE: CPT

## 2024-07-22 PROCEDURE — 80048 BASIC METABOLIC PNL TOTAL CA: CPT

## 2024-07-22 NOTE — PROGRESS NOTES
07/23/24 0001   Pre-Procedure Phone Call   Procedure Time Verified Yes   Arrival Time 1215   Procedure Location Verified Yes   Medical History Reviewed Yes   NPO Status Reinforced Yes   Ride and Caregiver Arranged Yes   Phone Number for Ride/Caregiver Renetta Rosales - [phone]   Patient Knows to Bring Current Medications Yes   Bring Outside Films Requested No

## 2024-07-22 NOTE — PROGRESS NOTES
07/23/24 0001   Pre-Procedure Phone Call   Procedure Time Verified Yes   Arrival Time 1215   Procedure Location Verified Yes   Medical History Reviewed Yes   NPO Status Reinforced Yes   Ride and Caregiver Arranged Yes   Phone Number for Ride/Caregiver Renetta Rosales - [phone]   Patient Knows to Bring Current Medications Yes   Bring Outside Films Requested No

## 2024-07-23 ENCOUNTER — ANESTHESIA (OUTPATIENT)
Dept: MRI IMAGING | Facility: HOSPITAL | Age: 64
End: 2024-07-23
Payer: COMMERCIAL

## 2024-07-23 ENCOUNTER — HOSPITAL ENCOUNTER (OUTPATIENT)
Dept: MRI IMAGING | Facility: HOSPITAL | Age: 64
Discharge: HOME | End: 2024-07-23
Payer: COMMERCIAL

## 2024-07-23 ENCOUNTER — ANESTHESIA EVENT (OUTPATIENT)
Dept: MRI IMAGING | Facility: HOSPITAL | Age: 64
End: 2024-07-23
Payer: COMMERCIAL

## 2024-07-23 VITALS
RESPIRATION RATE: 18 BRPM | DIASTOLIC BLOOD PRESSURE: 78 MMHG | SYSTOLIC BLOOD PRESSURE: 135 MMHG | HEART RATE: 50 BPM | OXYGEN SATURATION: 100 % | BODY MASS INDEX: 25.49 KG/M2 | TEMPERATURE: 98 F | HEIGHT: 75 IN | WEIGHT: 205 LBS

## 2024-07-23 DIAGNOSIS — M54.9 DORSALGIA: ICD-10-CM

## 2024-07-23 PROCEDURE — 25010000002 DEXAMETHASONE PER 1 MG: Performed by: NURSE ANESTHETIST, CERTIFIED REGISTERED

## 2024-07-23 PROCEDURE — 72141 MRI NECK SPINE W/O DYE: CPT

## 2024-07-23 PROCEDURE — 25010000002 PROPOFOL 10 MG/ML EMULSION: Performed by: NURSE ANESTHETIST, CERTIFIED REGISTERED

## 2024-07-23 PROCEDURE — 72146 MRI CHEST SPINE W/O DYE: CPT

## 2024-07-23 RX ORDER — PROMETHAZINE HYDROCHLORIDE 25 MG/1
25 TABLET ORAL ONCE AS NEEDED
Status: DISCONTINUED | OUTPATIENT
Start: 2024-07-23 | End: 2024-07-24 | Stop reason: HOSPADM

## 2024-07-23 RX ORDER — LIDOCAINE HYDROCHLORIDE 20 MG/ML
INJECTION, SOLUTION INFILTRATION; PERINEURAL AS NEEDED
Status: DISCONTINUED | OUTPATIENT
Start: 2024-07-23 | End: 2024-07-23 | Stop reason: SURG

## 2024-07-23 RX ORDER — DIPHENHYDRAMINE HYDROCHLORIDE 50 MG/ML
12.5 INJECTION INTRAMUSCULAR; INTRAVENOUS
Status: DISCONTINUED | OUTPATIENT
Start: 2024-07-23 | End: 2024-07-24 | Stop reason: HOSPADM

## 2024-07-23 RX ORDER — DEXAMETHASONE SODIUM PHOSPHATE 4 MG/ML
INJECTION, SOLUTION INTRA-ARTICULAR; INTRALESIONAL; INTRAMUSCULAR; INTRAVENOUS; SOFT TISSUE AS NEEDED
Status: DISCONTINUED | OUTPATIENT
Start: 2024-07-23 | End: 2024-07-23 | Stop reason: SURG

## 2024-07-23 RX ORDER — EPHEDRINE SULFATE 50 MG/ML
INJECTION, SOLUTION INTRAVENOUS AS NEEDED
Status: DISCONTINUED | OUTPATIENT
Start: 2024-07-23 | End: 2024-07-23 | Stop reason: SURG

## 2024-07-23 RX ORDER — DROPERIDOL 2.5 MG/ML
0.62 INJECTION, SOLUTION INTRAMUSCULAR; INTRAVENOUS
Status: DISCONTINUED | OUTPATIENT
Start: 2024-07-23 | End: 2024-07-24 | Stop reason: HOSPADM

## 2024-07-23 RX ORDER — PROPOFOL 10 MG/ML
VIAL (ML) INTRAVENOUS AS NEEDED
Status: DISCONTINUED | OUTPATIENT
Start: 2024-07-23 | End: 2024-07-23 | Stop reason: SURG

## 2024-07-23 RX ORDER — FLUTICASONE PROPIONATE 50 MCG
2 SPRAY, SUSPENSION (ML) NASAL DAILY
COMMUNITY
Start: 2024-07-06

## 2024-07-23 RX ORDER — NALOXONE HCL 0.4 MG/ML
0.2 VIAL (ML) INJECTION AS NEEDED
Status: DISCONTINUED | OUTPATIENT
Start: 2024-07-23 | End: 2024-07-24 | Stop reason: HOSPADM

## 2024-07-23 RX ORDER — LEVOCETIRIZINE DIHYDROCHLORIDE 5 MG/1
5 TABLET, FILM COATED ORAL NIGHTLY PRN
COMMUNITY
Start: 2024-07-06

## 2024-07-23 RX ORDER — ONDANSETRON 2 MG/ML
4 INJECTION INTRAMUSCULAR; INTRAVENOUS ONCE AS NEEDED
Status: DISCONTINUED | OUTPATIENT
Start: 2024-07-23 | End: 2024-07-24 | Stop reason: HOSPADM

## 2024-07-23 RX ORDER — SODIUM CHLORIDE 9 MG/ML
INJECTION, SOLUTION INTRAVENOUS CONTINUOUS PRN
Status: DISCONTINUED | OUTPATIENT
Start: 2024-07-23 | End: 2024-07-23 | Stop reason: SURG

## 2024-07-23 RX ORDER — FLUMAZENIL 0.1 MG/ML
0.2 INJECTION INTRAVENOUS AS NEEDED
Status: DISCONTINUED | OUTPATIENT
Start: 2024-07-23 | End: 2024-07-24 | Stop reason: HOSPADM

## 2024-07-23 RX ORDER — PROMETHAZINE HYDROCHLORIDE 25 MG/1
25 SUPPOSITORY RECTAL ONCE AS NEEDED
Status: DISCONTINUED | OUTPATIENT
Start: 2024-07-23 | End: 2024-07-24 | Stop reason: HOSPADM

## 2024-07-23 RX ORDER — SILDENAFIL 50 MG/1
50 TABLET, FILM COATED ORAL AS NEEDED
COMMUNITY
Start: 2024-06-03

## 2024-07-23 RX ADMIN — SODIUM CHLORIDE: 9 INJECTION, SOLUTION INTRAVENOUS at 13:35

## 2024-07-23 RX ADMIN — LIDOCAINE HYDROCHLORIDE 100 MG: 20 INJECTION, SOLUTION INFILTRATION; PERINEURAL at 13:46

## 2024-07-23 RX ADMIN — EPHEDRINE SULFATE 10 MG: 50 INJECTION INTRAVENOUS at 14:27

## 2024-07-23 RX ADMIN — PROPOFOL 100 MG: 10 INJECTION, EMULSION INTRAVENOUS at 13:48

## 2024-07-23 RX ADMIN — DEXAMETHASONE SODIUM PHOSPHATE 8 MG: 4 INJECTION, SOLUTION INTRA-ARTICULAR; INTRALESIONAL; INTRAMUSCULAR; INTRAVENOUS; SOFT TISSUE at 14:00

## 2024-07-23 RX ADMIN — PROPOFOL 200 MG: 10 INJECTION, EMULSION INTRAVENOUS at 13:46

## 2024-07-23 NOTE — ANESTHESIA POSTPROCEDURE EVALUATION
"Patient: Carson Rosales    Procedure Summary       Date: 07/23/24 Room / Location: [facility] MRI; [facility] INTERVENTIONAL    Anesthesia Start: 1335 Anesthesia Stop: 1506    Procedures:       MRI CERVICAL SPINE WO CONTRAST      MRI THORACIC SPINE WO CONTRAST Diagnosis: Dorsalgia    Scheduled Providers:  Provider: Maco Nicolas MD    Anesthesia Type: general ASA Status: 2            Anesthesia Type: general    Vitals  Vitals Value Taken Time   /78 07/23/24 1546   Temp     Pulse 55 07/23/24 1551   Resp 18 07/23/24 1545   SpO2 95 % 07/23/24 1551   Vitals shown include unfiled device data.        Post Anesthesia Care and Evaluation    Pain management: adequate    Airway patency: patent  Anesthetic complications: No anesthetic complications    Cardiovascular status: acceptable  Respiratory status: acceptable  Hydration status: acceptable    Comments: /78 (BP Location: Left arm, Patient Position: Lying)   Pulse 50   Temp 36.7 °C (98 °F) (Oral)   Resp 18   Ht 190.5 cm (75\")   Wt 93 kg (205 lb)   SpO2 100%   BMI 25.62 kg/m²     No anesthesia complications reported to me.    "

## 2024-07-23 NOTE — ANESTHESIA PREPROCEDURE EVALUATION
Anesthesia Evaluation     NPO Solid Status: > 8 hours             Airway   Mallampati: II  TM distance: >3 FB  Neck ROM: full  no difficulty expected  Dental - normal exam     Pulmonary - normal exam   (+) a smoker,  Cardiovascular - normal exam    (+) dysrhythmias Bradycardia      Neuro/Psych  GI/Hepatic/Renal/Endo      Musculoskeletal     (+) neck pain  Abdominal    Substance History      OB/GYN          Other   arthritis,                 Anesthesia Plan    ASA 2     general     (Extreme claustrophobia)    Anesthetic plan, risks, benefits, and alternatives have been provided, discussed and informed consent has been obtained with: patient.    CODE STATUS:

## 2024-07-23 NOTE — ANESTHESIA PROCEDURE NOTES
Airway  Urgency: elective    Date/Time: 7/23/2024 1:47 PM  Airway not difficult    General Information and Staff    Patient location during procedure: OR  CRNA/CAA: Nahum Triana CRNA    Indications and Patient Condition  Indications for airway management: airway protection    Preoxygenated: yes  Mask difficulty assessment: 1 - vent by mask    Final Airway Details  Final airway type: supraglottic airway      Successful airway: LMA and unique  Size 4     Number of attempts at approach: 1  Assessment: lips, teeth, and gum same as pre-op and atraumatic intubation

## 2024-09-18 ENCOUNTER — TRANSCRIBE ORDERS (OUTPATIENT)
Dept: ADMINISTRATIVE | Facility: HOSPITAL | Age: 64
End: 2024-09-18
Payer: COMMERCIAL

## 2024-09-18 DIAGNOSIS — N13.8 ENLARGED PROSTATE WITH URINARY OBSTRUCTION: ICD-10-CM

## 2024-09-18 DIAGNOSIS — N40.1 ENLARGED PROSTATE WITH URINARY OBSTRUCTION: ICD-10-CM

## 2024-09-18 DIAGNOSIS — N40.1 BENIGN PROSTATIC HYPERPLASIA WITH LOWER URINARY TRACT SYMPTOMS, SYMPTOM DETAILS UNSPECIFIED: ICD-10-CM

## 2024-09-18 DIAGNOSIS — N48.89 EDEMA OF PENIS: Primary | ICD-10-CM

## 2024-10-08 ENCOUNTER — TRANSCRIBE ORDERS (OUTPATIENT)
Dept: LAB | Facility: HOSPITAL | Age: 64
End: 2024-10-08
Payer: COMMERCIAL

## 2024-10-08 ENCOUNTER — TRANSCRIBE ORDERS (OUTPATIENT)
Dept: CARDIOLOGY | Facility: HOSPITAL | Age: 64
End: 2024-10-08
Payer: COMMERCIAL

## 2024-10-08 ENCOUNTER — LAB (OUTPATIENT)
Dept: LAB | Facility: HOSPITAL | Age: 64
End: 2024-10-08
Payer: COMMERCIAL

## 2024-10-08 ENCOUNTER — HOSPITAL ENCOUNTER (OUTPATIENT)
Dept: CARDIOLOGY | Facility: HOSPITAL | Age: 64
Discharge: HOME | End: 2024-10-08
Payer: COMMERCIAL

## 2024-10-08 DIAGNOSIS — N48.89 EDEMA OF PENIS: Primary | ICD-10-CM

## 2024-10-08 DIAGNOSIS — N48.89 EDEMA OF PENIS: ICD-10-CM

## 2024-10-08 DIAGNOSIS — Z01.811 PRE-OP CHEST EXAM: Primary | ICD-10-CM

## 2024-10-08 LAB
ANION GAP SERPL CALCULATED.3IONS-SCNC: 11.4 MMOL/L (ref 5–15)
BUN SERPL-MCNC: 13 MG/DL (ref 8–23)
BUN/CREAT SERPL: 12.4 (ref 7–25)
CALCIUM SPEC-SCNC: 8.7 MG/DL (ref 8.6–10.5)
CHLORIDE SERPL-SCNC: 103 MMOL/L (ref 98–107)
CO2 SERPL-SCNC: 24.6 MMOL/L (ref 22–29)
CREAT SERPL-MCNC: 1.05 MG/DL (ref 0.76–1.27)
EGFRCR SERPLBLD CKD-EPI 2021: 80.8 ML/MIN/1.73
GLUCOSE SERPL-MCNC: 115 MG/DL (ref 65–99)
POTASSIUM SERPL-SCNC: 4.1 MMOL/L (ref 3.5–5.2)
QT INTERVAL: 441 MS
QTC INTERVAL: 399 MS
SODIUM SERPL-SCNC: 139 MMOL/L (ref 136–145)

## 2024-10-08 PROCEDURE — 36415 COLL VENOUS BLD VENIPUNCTURE: CPT

## 2024-10-08 PROCEDURE — 80048 BASIC METABOLIC PNL TOTAL CA: CPT

## 2024-10-08 PROCEDURE — 93005 ELECTROCARDIOGRAM TRACING: CPT

## 2024-10-08 NOTE — PROGRESS NOTES
10/09/24 0001   Pre-Procedure Phone Call   Procedure Time Verified Yes   Arrival Time 1130   Medical History Reviewed No   NPO Status Reinforced Yes   Ride and Caregiver Arranged Yes   Patient Knows to Bring Current Medications Yes   Bring Outside Films Requested Yes     Left detailed message at [id number]

## 2024-10-09 ENCOUNTER — HOSPITAL ENCOUNTER (OUTPATIENT)
Dept: MRI IMAGING | Facility: HOSPITAL | Age: 64
Discharge: HOME | End: 2024-10-09
Payer: COMMERCIAL

## 2024-10-09 ENCOUNTER — ANESTHESIA EVENT (OUTPATIENT)
Dept: MRI IMAGING | Facility: HOSPITAL | Age: 64
End: 2024-10-09
Payer: COMMERCIAL

## 2024-10-09 ENCOUNTER — ANESTHESIA (OUTPATIENT)
Dept: MRI IMAGING | Facility: HOSPITAL | Age: 64
End: 2024-10-09
Payer: COMMERCIAL

## 2024-10-09 VITALS
DIASTOLIC BLOOD PRESSURE: 76 MMHG | SYSTOLIC BLOOD PRESSURE: 139 MMHG | TEMPERATURE: 97.7 F | RESPIRATION RATE: 12 BRPM | OXYGEN SATURATION: 99 % | BODY MASS INDEX: 25.49 KG/M2 | HEIGHT: 75 IN | WEIGHT: 205 LBS | HEART RATE: 49 BPM

## 2024-10-09 DIAGNOSIS — N48.89 EDEMA OF PENIS: ICD-10-CM

## 2024-10-09 DIAGNOSIS — N40.1 BENIGN PROSTATIC HYPERPLASIA WITH LOWER URINARY TRACT SYMPTOMS, SYMPTOM DETAILS UNSPECIFIED: ICD-10-CM

## 2024-10-09 PROCEDURE — 0 GADOBENATE DIMEGLUMINE 529 MG/ML SOLUTION: Performed by: UROLOGY

## 2024-10-09 PROCEDURE — 25810000003 LACTATED RINGERS PER 1000 ML

## 2024-10-09 PROCEDURE — 25010000002 LIDOCAINE 2% SOLUTION

## 2024-10-09 PROCEDURE — A9577 INJ MULTIHANCE: HCPCS | Performed by: UROLOGY

## 2024-10-09 PROCEDURE — 25010000002 PROPOFOL 10 MG/ML EMULSION

## 2024-10-09 PROCEDURE — 72197 MRI PELVIS W/O & W/DYE: CPT

## 2024-10-09 PROCEDURE — 25010000002 ONDANSETRON PER 1 MG

## 2024-10-09 RX ORDER — HYDROMORPHONE HYDROCHLORIDE 1 MG/ML
0.25 INJECTION, SOLUTION INTRAMUSCULAR; INTRAVENOUS; SUBCUTANEOUS
Status: DISCONTINUED | OUTPATIENT
Start: 2024-10-09 | End: 2024-10-10 | Stop reason: HOSPADM

## 2024-10-09 RX ORDER — ONDANSETRON 2 MG/ML
4 INJECTION INTRAMUSCULAR; INTRAVENOUS ONCE AS NEEDED
Status: DISCONTINUED | OUTPATIENT
Start: 2024-10-09 | End: 2024-10-10 | Stop reason: HOSPADM

## 2024-10-09 RX ORDER — DROPERIDOL 2.5 MG/ML
0.62 INJECTION, SOLUTION INTRAMUSCULAR; INTRAVENOUS
Status: DISCONTINUED | OUTPATIENT
Start: 2024-10-09 | End: 2024-10-10 | Stop reason: HOSPADM

## 2024-10-09 RX ORDER — IPRATROPIUM BROMIDE AND ALBUTEROL SULFATE 2.5; .5 MG/3ML; MG/3ML
3 SOLUTION RESPIRATORY (INHALATION) ONCE AS NEEDED
Status: DISCONTINUED | OUTPATIENT
Start: 2024-10-09 | End: 2024-10-10 | Stop reason: HOSPADM

## 2024-10-09 RX ORDER — HYDROCODONE BITARTRATE AND ACETAMINOPHEN 7.5; 325 MG/1; MG/1
1 TABLET ORAL EVERY 4 HOURS PRN
Status: DISCONTINUED | OUTPATIENT
Start: 2024-10-09 | End: 2024-10-10 | Stop reason: HOSPADM

## 2024-10-09 RX ORDER — FLUMAZENIL 0.1 MG/ML
0.2 INJECTION INTRAVENOUS AS NEEDED
Status: DISCONTINUED | OUTPATIENT
Start: 2024-10-09 | End: 2024-10-10 | Stop reason: HOSPADM

## 2024-10-09 RX ORDER — PROMETHAZINE HYDROCHLORIDE 25 MG/1
25 SUPPOSITORY RECTAL ONCE AS NEEDED
Status: DISCONTINUED | OUTPATIENT
Start: 2024-10-09 | End: 2024-10-10 | Stop reason: HOSPADM

## 2024-10-09 RX ORDER — PROMETHAZINE HYDROCHLORIDE 25 MG/1
25 TABLET ORAL ONCE AS NEEDED
Status: DISCONTINUED | OUTPATIENT
Start: 2024-10-09 | End: 2024-10-10 | Stop reason: HOSPADM

## 2024-10-09 RX ORDER — HYDRALAZINE HYDROCHLORIDE 20 MG/ML
5 INJECTION INTRAMUSCULAR; INTRAVENOUS
Status: DISCONTINUED | OUTPATIENT
Start: 2024-10-09 | End: 2024-10-10 | Stop reason: HOSPADM

## 2024-10-09 RX ORDER — PROPOFOL 10 MG/ML
VIAL (ML) INTRAVENOUS AS NEEDED
Status: DISCONTINUED | OUTPATIENT
Start: 2024-10-09 | End: 2024-10-09 | Stop reason: SURG

## 2024-10-09 RX ORDER — NALOXONE HCL 0.4 MG/ML
0.2 VIAL (ML) INJECTION AS NEEDED
Status: DISCONTINUED | OUTPATIENT
Start: 2024-10-09 | End: 2024-10-10 | Stop reason: HOSPADM

## 2024-10-09 RX ORDER — EPHEDRINE SULFATE 50 MG/ML
5 INJECTION, SOLUTION INTRAVENOUS ONCE AS NEEDED
Status: DISCONTINUED | OUTPATIENT
Start: 2024-10-09 | End: 2024-10-10 | Stop reason: HOSPADM

## 2024-10-09 RX ORDER — SODIUM CHLORIDE, SODIUM LACTATE, POTASSIUM CHLORIDE, CALCIUM CHLORIDE 600; 310; 30; 20 MG/100ML; MG/100ML; MG/100ML; MG/100ML
INJECTION, SOLUTION INTRAVENOUS CONTINUOUS PRN
Status: DISCONTINUED | OUTPATIENT
Start: 2024-10-09 | End: 2024-10-09 | Stop reason: SURG

## 2024-10-09 RX ORDER — ONDANSETRON 2 MG/ML
INJECTION INTRAMUSCULAR; INTRAVENOUS AS NEEDED
Status: DISCONTINUED | OUTPATIENT
Start: 2024-10-09 | End: 2024-10-09 | Stop reason: SURG

## 2024-10-09 RX ORDER — LABETALOL HYDROCHLORIDE 5 MG/ML
5 INJECTION, SOLUTION INTRAVENOUS
Status: DISCONTINUED | OUTPATIENT
Start: 2024-10-09 | End: 2024-10-10 | Stop reason: HOSPADM

## 2024-10-09 RX ORDER — LIDOCAINE HYDROCHLORIDE 20 MG/ML
INJECTION, SOLUTION INFILTRATION; PERINEURAL AS NEEDED
Status: DISCONTINUED | OUTPATIENT
Start: 2024-10-09 | End: 2024-10-09 | Stop reason: SURG

## 2024-10-09 RX ORDER — HYDROCODONE BITARTRATE AND ACETAMINOPHEN 5; 325 MG/1; MG/1
1 TABLET ORAL ONCE AS NEEDED
Status: DISCONTINUED | OUTPATIENT
Start: 2024-10-09 | End: 2024-10-10 | Stop reason: HOSPADM

## 2024-10-09 RX ORDER — CLONAZEPAM 0.5 MG/1
1 TABLET ORAL 2 TIMES DAILY PRN
COMMUNITY

## 2024-10-09 RX ORDER — FENTANYL CITRATE 50 UG/ML
25 INJECTION, SOLUTION INTRAMUSCULAR; INTRAVENOUS
Status: DISCONTINUED | OUTPATIENT
Start: 2024-10-09 | End: 2024-10-10 | Stop reason: HOSPADM

## 2024-10-09 RX ORDER — DIPHENHYDRAMINE HYDROCHLORIDE 50 MG/ML
12.5 INJECTION INTRAMUSCULAR; INTRAVENOUS
Status: DISCONTINUED | OUTPATIENT
Start: 2024-10-09 | End: 2024-10-10 | Stop reason: HOSPADM

## 2024-10-09 RX ADMIN — PROPOFOL 200 MG: 10 INJECTION, EMULSION INTRAVENOUS at 13:41

## 2024-10-09 RX ADMIN — GADOBENATE DIMEGLUMINE 19.6 ML: 529 INJECTION, SOLUTION INTRAVENOUS at 14:39

## 2024-10-09 RX ADMIN — ONDANSETRON 4 MG: 2 INJECTION INTRAMUSCULAR; INTRAVENOUS at 14:31

## 2024-10-09 RX ADMIN — SODIUM CHLORIDE, POTASSIUM CHLORIDE, SODIUM LACTATE AND CALCIUM CHLORIDE: 600; 310; 30; 20 INJECTION, SOLUTION INTRAVENOUS at 13:32

## 2024-10-09 RX ADMIN — LIDOCAINE HYDROCHLORIDE 100 MG: 20 INJECTION, SOLUTION INFILTRATION; PERINEURAL at 13:41

## 2024-10-09 NOTE — ANESTHESIA POSTPROCEDURE EVALUATION
Patient: Carson Rosales    Procedure Summary       Date: 10/09/24 Room / Location: [facility] MRI; [facility] INTERVENTIONAL    Anesthesia Start: 1332 Anesthesia Stop: 1438    Procedure: MRI PROSTATE W WO CONTRAST Diagnosis:       Edema of penis      Benign prostatic hyperplasia with lower urinary tract symptoms, symptom details unspecified      (n48.99)    Scheduled Providers:  Provider: Vanessa Adam MD    Anesthesia Type: general ASA Status: 2            Anesthesia Type: general    Vitals  Vitals Value Taken Time   /76 10/09/24 1543   Temp     Pulse 49 10/09/24 1544   Resp 10 10/09/24 1530   SpO2 99 % 10/09/24 1544   Vitals shown include unfiled device data.        Anesthesia Post Evaluation

## 2024-10-09 NOTE — ANESTHESIA PROCEDURE NOTES
Airway  Urgency: elective    Date/Time: 10/9/2024 1:43 PM  Airway not difficult    General Information and Staff    Patient location during procedure: radiology  Anesthesiologist: Vanessa Adam MD  CRNA/CAA: Bella Neely CRNA    Indications and Patient Condition  Indications for airway management: airway protection    Preoxygenated: yes  Mask difficulty assessment: 0 - not attempted    Final Airway Details  Final airway type: supraglottic airway      Successful airway: LMA and unique  Size 4  Airway Seal Pressure (cm H2O): 18     Number of attempts at approach: 1  Assessment: lips, teeth, and gum same as pre-op and atraumatic intubation

## 2024-10-09 NOTE — ANESTHESIA PREPROCEDURE EVALUATION
" Anesthesia Evaluation     Patient summary reviewed and Nursing notes reviewed   NPO Solid Status: > 8 hours  NPO Liquid Status: > 2 hours           Airway   Mallampati: II  TM distance: >3 FB  Neck ROM: full  No difficulty expected  Dental      Pulmonary    (+) a smoker Current, Abstained day of surgery, cigarettes,  Cardiovascular     (+) dysrhythmias Bradycardia      Neuro/Psych  (+) numbness (cervical radic), psychiatric history Anxiety  GI/Hepatic/Renal/Endo    (+) GERD well controlled    Musculoskeletal     (+) neck pain  Abdominal    Substance History      OB/GYN          Other   arthritis,                   Anesthesia Plan    ASA 2     general     (I have reviewed the patient's history and chart with the patient, including all pertinent laboratory results and imaging. I have explained the risks of anesthesia including but not limited to dental damage, corneal abrasion, nerve injury, MI, stroke, aspiration, and death. Patient has agreed to proceed.      /84 (BP Location: Left arm, Patient Position: Lying)   Pulse 51   Temp 36.5 °C (97.7 °F) (Oral)   Resp 16   Ht 190.5 cm (75\")   Wt 93 kg (205 lb)   SpO2 98%   BMI 25.62 kg/m²   )  intravenous induction     Anesthetic plan, risks, benefits, and alternatives have been provided, discussed and informed consent has been obtained with: patient.    CODE STATUS:         "

## 2024-11-08 ENCOUNTER — TRANSCRIBE ORDERS (OUTPATIENT)
Dept: GENERAL RADIOLOGY | Facility: HOSPITAL | Age: 64
End: 2024-11-08
Payer: COMMERCIAL

## 2024-11-08 DIAGNOSIS — N40.1 BPH WITH LOWER URINARY TRACT SYMPTOMS WITHOUT URINARY OBSTRUCTION: ICD-10-CM

## 2024-11-08 DIAGNOSIS — N48.89: Primary | ICD-10-CM

## 2024-11-20 ENCOUNTER — LAB (OUTPATIENT)
Dept: LAB | Facility: HOSPITAL | Age: 64
End: 2024-11-20
Payer: COMMERCIAL

## 2024-11-20 ENCOUNTER — TRANSCRIBE ORDERS (OUTPATIENT)
Dept: ADMINISTRATIVE | Facility: HOSPITAL | Age: 64
End: 2024-11-20
Payer: COMMERCIAL

## 2024-11-20 DIAGNOSIS — Z01.818 PRE-OP TESTING: ICD-10-CM

## 2024-11-20 DIAGNOSIS — N48.89 PENILE SWELLING: Primary | ICD-10-CM

## 2024-11-20 DIAGNOSIS — N48.89 PENILE SWELLING: ICD-10-CM

## 2024-11-20 LAB
ANION GAP SERPL CALCULATED.3IONS-SCNC: 9.8 MMOL/L (ref 5–15)
BUN SERPL-MCNC: 11 MG/DL (ref 8–23)
BUN/CREAT SERPL: 9.2 (ref 7–25)
CALCIUM SPEC-SCNC: 8.7 MG/DL (ref 8.6–10.5)
CHLORIDE SERPL-SCNC: 103 MMOL/L (ref 98–107)
CO2 SERPL-SCNC: 26.2 MMOL/L (ref 22–29)
CREAT SERPL-MCNC: 1.19 MG/DL (ref 0.76–1.27)
EGFRCR SERPLBLD CKD-EPI 2021: 69.1 ML/MIN/1.73
GLUCOSE SERPL-MCNC: 109 MG/DL (ref 65–99)
POTASSIUM SERPL-SCNC: 4.5 MMOL/L (ref 3.5–5.2)
SODIUM SERPL-SCNC: 139 MMOL/L (ref 136–145)

## 2024-11-20 PROCEDURE — 80048 BASIC METABOLIC PNL TOTAL CA: CPT

## 2024-11-20 PROCEDURE — 36415 COLL VENOUS BLD VENIPUNCTURE: CPT

## 2024-11-20 NOTE — PROGRESS NOTES
11/21/24 0001   Pre-Procedure Phone Call   Procedure Time Verified Yes   Arrival Time 1115   Procedure Location Verified Yes   Medical History Reviewed No   NPO Status Reinforced Yes   Ride and Caregiver Arranged Yes   Patient Knows to Bring Current Medications Yes   Bring Outside Films Requested No     Detailed message left on answering machine

## 2024-11-21 ENCOUNTER — HOSPITAL ENCOUNTER (OUTPATIENT)
Dept: MRI IMAGING | Facility: HOSPITAL | Age: 64
Discharge: HOME | End: 2024-11-21
Payer: COMMERCIAL

## 2024-11-22 ENCOUNTER — ANESTHESIA EVENT (OUTPATIENT)
Dept: MRI IMAGING | Facility: HOSPITAL | Age: 64
End: 2024-11-22
Payer: COMMERCIAL

## 2024-11-22 ENCOUNTER — HOSPITAL ENCOUNTER (OUTPATIENT)
Dept: MRI IMAGING | Facility: HOSPITAL | Age: 64
Discharge: HOME | End: 2024-11-22
Payer: COMMERCIAL

## 2024-11-22 ENCOUNTER — ANESTHESIA (OUTPATIENT)
Dept: MRI IMAGING | Facility: HOSPITAL | Age: 64
End: 2024-11-22
Payer: COMMERCIAL

## 2024-11-22 VITALS
DIASTOLIC BLOOD PRESSURE: 86 MMHG | OXYGEN SATURATION: 99 % | RESPIRATION RATE: 15 BRPM | HEIGHT: 75 IN | HEART RATE: 46 BPM | TEMPERATURE: 97.8 F | BODY MASS INDEX: 24.99 KG/M2 | WEIGHT: 201 LBS | SYSTOLIC BLOOD PRESSURE: 145 MMHG

## 2024-11-22 DIAGNOSIS — N40.1 BPH WITH LOWER URINARY TRACT SYMPTOMS WITHOUT URINARY OBSTRUCTION: ICD-10-CM

## 2024-11-22 DIAGNOSIS — N48.89: ICD-10-CM

## 2024-11-22 PROCEDURE — 25010000002 LIDOCAINE 2% SOLUTION: Performed by: NURSE ANESTHETIST, CERTIFIED REGISTERED

## 2024-11-22 PROCEDURE — 25010000002 PROPOFOL 10 MG/ML EMULSION: Performed by: NURSE ANESTHETIST, CERTIFIED REGISTERED

## 2024-11-22 PROCEDURE — 25010000002 GLYCOPYRROLATE 0.2 MG/ML SOLUTION: Performed by: NURSE ANESTHETIST, CERTIFIED REGISTERED

## 2024-11-22 PROCEDURE — 25010000002 ONDANSETRON PER 1 MG: Performed by: NURSE ANESTHETIST, CERTIFIED REGISTERED

## 2024-11-22 PROCEDURE — 25010000002 DEXAMETHASONE PER 1 MG: Performed by: NURSE ANESTHETIST, CERTIFIED REGISTERED

## 2024-11-22 PROCEDURE — A9577 INJ MULTIHANCE: HCPCS | Performed by: UROLOGY

## 2024-11-22 PROCEDURE — 72197 MRI PELVIS W/O & W/DYE: CPT

## 2024-11-22 PROCEDURE — 25510000002 GADOBENATE DIMEGLUMINE 529 MG/ML SOLUTION: Performed by: UROLOGY

## 2024-11-22 RX ORDER — GLYCOPYRROLATE 0.2 MG/ML
INJECTION INTRAMUSCULAR; INTRAVENOUS AS NEEDED
Status: DISCONTINUED | OUTPATIENT
Start: 2024-11-22 | End: 2024-11-22 | Stop reason: SURG

## 2024-11-22 RX ORDER — ONDANSETRON 2 MG/ML
4 INJECTION INTRAMUSCULAR; INTRAVENOUS ONCE AS NEEDED
OUTPATIENT
Start: 2024-11-22

## 2024-11-22 RX ORDER — LABETALOL HYDROCHLORIDE 5 MG/ML
5 INJECTION, SOLUTION INTRAVENOUS
OUTPATIENT
Start: 2024-11-22

## 2024-11-22 RX ORDER — EPHEDRINE SULFATE 50 MG/ML
5 INJECTION, SOLUTION INTRAVENOUS ONCE AS NEEDED
OUTPATIENT
Start: 2024-11-22

## 2024-11-22 RX ORDER — DEXAMETHASONE SODIUM PHOSPHATE 4 MG/ML
INJECTION, SOLUTION INTRA-ARTICULAR; INTRALESIONAL; INTRAMUSCULAR; INTRAVENOUS; SOFT TISSUE AS NEEDED
Status: DISCONTINUED | OUTPATIENT
Start: 2024-11-22 | End: 2024-11-22 | Stop reason: SURG

## 2024-11-22 RX ORDER — HYDROMORPHONE HYDROCHLORIDE 1 MG/ML
0.5 INJECTION, SOLUTION INTRAMUSCULAR; INTRAVENOUS; SUBCUTANEOUS
OUTPATIENT
Start: 2024-11-22

## 2024-11-22 RX ORDER — NALOXONE HCL 0.4 MG/ML
0.2 VIAL (ML) INJECTION AS NEEDED
OUTPATIENT
Start: 2024-11-22

## 2024-11-22 RX ORDER — PROMETHAZINE HYDROCHLORIDE 25 MG/1
25 SUPPOSITORY RECTAL ONCE AS NEEDED
OUTPATIENT
Start: 2024-11-22

## 2024-11-22 RX ORDER — HYDROCODONE BITARTRATE AND ACETAMINOPHEN 5; 325 MG/1; MG/1
1 TABLET ORAL ONCE AS NEEDED
OUTPATIENT
Start: 2024-11-22

## 2024-11-22 RX ORDER — IPRATROPIUM BROMIDE AND ALBUTEROL SULFATE 2.5; .5 MG/3ML; MG/3ML
3 SOLUTION RESPIRATORY (INHALATION) ONCE AS NEEDED
OUTPATIENT
Start: 2024-11-22

## 2024-11-22 RX ORDER — DIPHENHYDRAMINE HYDROCHLORIDE 50 MG/ML
12.5 INJECTION INTRAMUSCULAR; INTRAVENOUS
OUTPATIENT
Start: 2024-11-22

## 2024-11-22 RX ORDER — LIDOCAINE HYDROCHLORIDE 20 MG/ML
INJECTION, SOLUTION INFILTRATION; PERINEURAL AS NEEDED
Status: DISCONTINUED | OUTPATIENT
Start: 2024-11-22 | End: 2024-11-22 | Stop reason: SURG

## 2024-11-22 RX ORDER — FLUMAZENIL 0.1 MG/ML
0.2 INJECTION INTRAVENOUS AS NEEDED
OUTPATIENT
Start: 2024-11-22

## 2024-11-22 RX ORDER — ONDANSETRON 2 MG/ML
INJECTION INTRAMUSCULAR; INTRAVENOUS AS NEEDED
Status: DISCONTINUED | OUTPATIENT
Start: 2024-11-22 | End: 2024-11-22 | Stop reason: SURG

## 2024-11-22 RX ORDER — HYDRALAZINE HYDROCHLORIDE 20 MG/ML
5 INJECTION INTRAMUSCULAR; INTRAVENOUS
OUTPATIENT
Start: 2024-11-22

## 2024-11-22 RX ORDER — OXYCODONE AND ACETAMINOPHEN 7.5; 325 MG/1; MG/1
1 TABLET ORAL EVERY 4 HOURS PRN
OUTPATIENT
Start: 2024-11-22 | End: 2024-11-29

## 2024-11-22 RX ORDER — DROPERIDOL 2.5 MG/ML
0.62 INJECTION, SOLUTION INTRAMUSCULAR; INTRAVENOUS
OUTPATIENT
Start: 2024-11-22

## 2024-11-22 RX ORDER — FENTANYL CITRATE 50 UG/ML
50 INJECTION, SOLUTION INTRAMUSCULAR; INTRAVENOUS
OUTPATIENT
Start: 2024-11-22

## 2024-11-22 RX ORDER — PROMETHAZINE HYDROCHLORIDE 25 MG/1
25 TABLET ORAL ONCE AS NEEDED
OUTPATIENT
Start: 2024-11-22

## 2024-11-22 RX ORDER — SODIUM CHLORIDE 9 MG/ML
INJECTION, SOLUTION INTRAVENOUS CONTINUOUS PRN
Status: DISCONTINUED | OUTPATIENT
Start: 2024-11-22 | End: 2024-11-22 | Stop reason: SURG

## 2024-11-22 RX ORDER — PROPOFOL 10 MG/ML
VIAL (ML) INTRAVENOUS AS NEEDED
Status: DISCONTINUED | OUTPATIENT
Start: 2024-11-22 | End: 2024-11-22 | Stop reason: SURG

## 2024-11-22 RX ADMIN — ONDANSETRON 4 MG: 2 INJECTION INTRAMUSCULAR; INTRAVENOUS at 14:17

## 2024-11-22 RX ADMIN — SODIUM CHLORIDE: 9 INJECTION, SOLUTION INTRAVENOUS at 13:32

## 2024-11-22 RX ADMIN — GLYCOPYRROLATE 0.2 MG: 0.2 INJECTION INTRAMUSCULAR; INTRAVENOUS at 13:42

## 2024-11-22 RX ADMIN — DEXAMETHASONE SODIUM PHOSPHATE 8 MG: 4 INJECTION, SOLUTION INTRA-ARTICULAR; INTRALESIONAL; INTRAMUSCULAR; INTRAVENOUS; SOFT TISSUE at 13:49

## 2024-11-22 RX ADMIN — PROPOFOL 50 MG: 10 INJECTION, EMULSION INTRAVENOUS at 13:47

## 2024-11-22 RX ADMIN — LIDOCAINE HYDROCHLORIDE 100 MG: 20 INJECTION, SOLUTION INFILTRATION; PERINEURAL at 13:42

## 2024-11-22 RX ADMIN — PROPOFOL 50 MG: 10 INJECTION, EMULSION INTRAVENOUS at 13:52

## 2024-11-22 RX ADMIN — PROPOFOL 200 MG: 10 INJECTION, EMULSION INTRAVENOUS at 13:42

## 2024-11-22 RX ADMIN — GADOBENATE DIMEGLUMINE 20 ML: 529 INJECTION, SOLUTION INTRAVENOUS at 14:39

## 2024-11-22 NOTE — ANESTHESIA PREPROCEDURE EVALUATION
" Anesthesia Evaluation     Patient summary reviewed and Nursing notes reviewed   NPO Solid Status: > 8 hours  NPO Liquid Status: > 2 hours           Airway   Mallampati: II  TM distance: >3 FB  Neck ROM: full  No difficulty expected  Comment: LMA 4 used in past  Dental      Pulmonary    (+) a smoker Current,  Cardiovascular     ECG reviewed    (+) dysrhythmias Bradycardia    ROS comment: EF 62% by ECHO 9/22/normal stress test 9/22/sinus bradycardia    Neuro/Psych  (+) numbness, psychiatric history Anxiety    ROS Comment: Claustrophobia  GI/Hepatic/Renal/Endo    (+) GERD    Musculoskeletal     (+) neck pain      ROS comment: Cervical, thoracic and lumbar DDD/hx mandible fx surgery  Abdominal    Substance History      OB/GYN          Other   arthritis,                   Anesthesia Plan    ASA 2     general     (LMA    I have reviewed the patient's history and chart with the patient, including all pertinent laboratory results and imaging. I have explained the risks of anesthesia including but not limited to dental damage, corneal abrasion, nerve injury, MI, stroke, aspiration, and death. Patient has agreed to proceed.      /85 (BP Location: Left arm, Patient Position: Lying)   Pulse 55   Temp 36.6 °C (97.8 °F) (Oral)   Resp 18   Ht 190.5 cm (75\")   Wt 91.2 kg (201 lb)   SpO2 99%   BMI 25.12 kg/m²   )  intravenous induction     Anesthetic plan, risks, benefits, and alternatives have been provided, discussed and informed consent has been obtained with: patient.      CODE STATUS:         "

## 2024-11-22 NOTE — NURSING NOTE
Discharged home via private car. Escorted to vehicle per this RN. No acute distress noted and tolerating po well. All belongings returned to patient prior to discharge and discharge instructions given verbally and in writing and to expect postop call the next day.

## 2024-11-22 NOTE — ANESTHESIA POSTPROCEDURE EVALUATION
Patient: Carson Rosales    Procedure Summary       Date: 11/22/24 Room / Location: [facility] MRI; [facility] INTERVENTIONAL    Anesthesia Start: 1332 Anesthesia Stop: 1442    Procedure: MRI PELVIS W WO CONTRAST Diagnosis:       Edema, penis      BPH with lower urinary tract symptoms without urinary obstruction      (Edema penis, BPH with low urinary tract symptoms)    Scheduled Providers:  Provider: Vanessa Adam MD    Anesthesia Type: general ASA Status: 2            Anesthesia Type: general    Vitals  Vitals Value Taken Time   /86 11/22/24 1546   Temp     Pulse 46 11/22/24 1550   Resp 15 11/22/24 1545   SpO2 100 % 11/22/24 1550   Vitals shown include unfiled device data.        Post Anesthesia Care and Evaluation    Patient location during evaluation: bedside  Patient participation: complete - patient participated  Level of consciousness: awake and alert  Pain management: adequate    Airway patency: patent  Anesthetic complications: No anesthetic complications  PONV Status: controlled  Cardiovascular status: acceptable  Respiratory status: acceptable  Hydration status: acceptable

## 2024-11-22 NOTE — ANESTHESIA PROCEDURE NOTES
Airway  Urgency: elective    Date/Time: 11/22/2024 1:45 PM  Airway not difficult    General Information and Staff    Patient location during procedure: radiology  Anesthesiologist: Vanessa Adam MD  CRNA/CAA: Stuart Granados CRNA    Indications and Patient Condition  Indications for airway management: airway protection    Preoxygenated: yes  Mask difficulty assessment: 0 - not attempted    Final Airway Details  Final airway type: supraglottic airway      Successful airway: LMA and unique  Size 5  Cuff Pressure (cm H2O): 25  Airway Seal Pressure (cm H2O): 25     Number of attempts at approach: 1  Assessment: lips, teeth, and gum same as pre-op and atraumatic intubation

## 2024-11-22 NOTE — NURSING NOTE
Patient arrived to bay [facility] in IR preop area. PPE worn per patient and care providers for any bedside care.